# Patient Record
Sex: FEMALE | Race: BLACK OR AFRICAN AMERICAN | NOT HISPANIC OR LATINO
[De-identification: names, ages, dates, MRNs, and addresses within clinical notes are randomized per-mention and may not be internally consistent; named-entity substitution may affect disease eponyms.]

---

## 2017-08-18 ENCOUNTER — APPOINTMENT (OUTPATIENT)
Dept: PULMONOLOGY | Facility: CLINIC | Age: 37
End: 2017-08-18
Payer: MEDICAID

## 2017-08-18 PROCEDURE — 94060 EVALUATION OF WHEEZING: CPT

## 2017-08-18 PROCEDURE — 94726 PLETHYSMOGRAPHY LUNG VOLUMES: CPT

## 2017-08-18 PROCEDURE — 94729 DIFFUSING CAPACITY: CPT

## 2017-10-23 ENCOUNTER — EMERGENCY (EMERGENCY)
Facility: HOSPITAL | Age: 37
LOS: 1 days | Discharge: HOME | End: 2017-10-23
Attending: EMERGENCY MEDICINE | Admitting: EMERGENCY MEDICINE
Payer: MEDICAID

## 2017-10-23 VITALS
RESPIRATION RATE: 18 BRPM | TEMPERATURE: 98 F | SYSTOLIC BLOOD PRESSURE: 112 MMHG | DIASTOLIC BLOOD PRESSURE: 67 MMHG | OXYGEN SATURATION: 97 % | HEART RATE: 90 BPM

## 2017-10-23 VITALS
HEART RATE: 84 BPM | TEMPERATURE: 99 F | DIASTOLIC BLOOD PRESSURE: 102 MMHG | SYSTOLIC BLOOD PRESSURE: 156 MMHG | RESPIRATION RATE: 18 BRPM | OXYGEN SATURATION: 98 %

## 2017-10-23 LAB
ALBUMIN SERPL ELPH-MCNC: 4.1 G/DL — SIGNIFICANT CHANGE UP (ref 3.3–5)
ALP SERPL-CCNC: 64 U/L — SIGNIFICANT CHANGE UP (ref 40–120)
ALT FLD-CCNC: 15 U/L — SIGNIFICANT CHANGE UP (ref 4–33)
APPEARANCE UR: CLEAR — SIGNIFICANT CHANGE UP
AST SERPL-CCNC: 20 U/L — SIGNIFICANT CHANGE UP (ref 4–32)
BACTERIA # UR AUTO: SIGNIFICANT CHANGE UP
BASE EXCESS BLDV CALC-SCNC: 7.2 MMOL/L — SIGNIFICANT CHANGE UP
BASOPHILS # BLD AUTO: 0.03 K/UL — SIGNIFICANT CHANGE UP (ref 0–0.2)
BASOPHILS NFR BLD AUTO: 0.3 % — SIGNIFICANT CHANGE UP (ref 0–2)
BILIRUB SERPL-MCNC: 0.3 MG/DL — SIGNIFICANT CHANGE UP (ref 0.2–1.2)
BILIRUB UR-MCNC: NEGATIVE — SIGNIFICANT CHANGE UP
BLOOD UR QL VISUAL: NEGATIVE — SIGNIFICANT CHANGE UP
BUN SERPL-MCNC: 8 MG/DL — SIGNIFICANT CHANGE UP (ref 7–23)
CALCIUM SERPL-MCNC: 9.7 MG/DL — SIGNIFICANT CHANGE UP (ref 8.4–10.5)
CHLORIDE SERPL-SCNC: 96 MMOL/L — LOW (ref 98–107)
CK MB BLD-MCNC: 2.98 NG/ML — SIGNIFICANT CHANGE UP (ref 1–4.7)
CK MB BLD-MCNC: 3.01 NG/ML — SIGNIFICANT CHANGE UP (ref 1–4.7)
CK SERPL-CCNC: 250 U/L — HIGH (ref 25–170)
CK SERPL-CCNC: 258 U/L — HIGH (ref 25–170)
CO2 SERPL-SCNC: 29 MMOL/L — SIGNIFICANT CHANGE UP (ref 22–31)
COLOR SPEC: SIGNIFICANT CHANGE UP
CREAT SERPL-MCNC: 0.59 MG/DL — SIGNIFICANT CHANGE UP (ref 0.5–1.3)
EOSINOPHIL # BLD AUTO: 0.24 K/UL — SIGNIFICANT CHANGE UP (ref 0–0.5)
EOSINOPHIL NFR BLD AUTO: 2.5 % — SIGNIFICANT CHANGE UP (ref 0–6)
GAS PNL BLDV: 136 MMOL/L — SIGNIFICANT CHANGE UP (ref 136–146)
GLUCOSE BLDV-MCNC: 97 — SIGNIFICANT CHANGE UP (ref 70–99)
GLUCOSE SERPL-MCNC: 91 MG/DL — SIGNIFICANT CHANGE UP (ref 70–99)
GLUCOSE UR-MCNC: NEGATIVE — SIGNIFICANT CHANGE UP
HCO3 BLDV-SCNC: 28 MMOL/L — HIGH (ref 20–27)
HCT VFR BLD CALC: 41.9 % — SIGNIFICANT CHANGE UP (ref 34.5–45)
HCT VFR BLDV CALC: 41.2 % — SIGNIFICANT CHANGE UP (ref 34.5–45)
HGB BLD-MCNC: 13 G/DL — SIGNIFICANT CHANGE UP (ref 11.5–15.5)
HGB BLDV-MCNC: 13.4 G/DL — SIGNIFICANT CHANGE UP (ref 11.5–15.5)
IMM GRANULOCYTES # BLD AUTO: 0.02 # — SIGNIFICANT CHANGE UP
IMM GRANULOCYTES NFR BLD AUTO: 0.2 % — SIGNIFICANT CHANGE UP (ref 0–1.5)
KETONES UR-MCNC: NEGATIVE — SIGNIFICANT CHANGE UP
LEUKOCYTE ESTERASE UR-ACNC: NEGATIVE — SIGNIFICANT CHANGE UP
LIDOCAIN IGE QN: 32.2 U/L — SIGNIFICANT CHANGE UP (ref 7–60)
LYMPHOCYTES # BLD AUTO: 2.49 K/UL — SIGNIFICANT CHANGE UP (ref 1–3.3)
LYMPHOCYTES # BLD AUTO: 26.4 % — SIGNIFICANT CHANGE UP (ref 13–44)
MCHC RBC-ENTMCNC: 26.5 PG — LOW (ref 27–34)
MCHC RBC-ENTMCNC: 31 % — LOW (ref 32–36)
MCV RBC AUTO: 85.3 FL — SIGNIFICANT CHANGE UP (ref 80–100)
MONOCYTES # BLD AUTO: 0.32 K/UL — SIGNIFICANT CHANGE UP (ref 0–0.9)
MONOCYTES NFR BLD AUTO: 3.4 % — SIGNIFICANT CHANGE UP (ref 2–14)
MUCOUS THREADS # UR AUTO: SIGNIFICANT CHANGE UP
NEUTROPHILS # BLD AUTO: 6.34 K/UL — SIGNIFICANT CHANGE UP (ref 1.8–7.4)
NEUTROPHILS NFR BLD AUTO: 67.2 % — SIGNIFICANT CHANGE UP (ref 43–77)
NITRITE UR-MCNC: NEGATIVE — SIGNIFICANT CHANGE UP
NRBC # FLD: 0 — SIGNIFICANT CHANGE UP
PCO2 BLDV: 58 MMHG — HIGH (ref 41–51)
PH BLDV: 7.37 PH — SIGNIFICANT CHANGE UP (ref 7.32–7.43)
PH UR: 7.5 — SIGNIFICANT CHANGE UP (ref 4.6–8)
PLATELET # BLD AUTO: 337 K/UL — SIGNIFICANT CHANGE UP (ref 150–400)
PMV BLD: 9.8 FL — SIGNIFICANT CHANGE UP (ref 7–13)
PO2 BLDV: < 24 MMHG — LOW (ref 35–40)
POTASSIUM BLDV-SCNC: 3.7 MMOL/L — SIGNIFICANT CHANGE UP (ref 3.4–4.5)
POTASSIUM SERPL-MCNC: 3.8 MMOL/L — SIGNIFICANT CHANGE UP (ref 3.5–5.3)
POTASSIUM SERPL-SCNC: 3.8 MMOL/L — SIGNIFICANT CHANGE UP (ref 3.5–5.3)
PROT SERPL-MCNC: 8.1 G/DL — SIGNIFICANT CHANGE UP (ref 6–8.3)
PROT UR-MCNC: NEGATIVE — SIGNIFICANT CHANGE UP
RBC # BLD: 4.91 M/UL — SIGNIFICANT CHANGE UP (ref 3.8–5.2)
RBC # FLD: 14.1 % — SIGNIFICANT CHANGE UP (ref 10.3–14.5)
RBC CASTS # UR COMP ASSIST: SIGNIFICANT CHANGE UP (ref 0–?)
SAO2 % BLDV: 35 % — LOW (ref 60–85)
SODIUM SERPL-SCNC: 140 MMOL/L — SIGNIFICANT CHANGE UP (ref 135–145)
SP GR SPEC: 1.01 — SIGNIFICANT CHANGE UP (ref 1–1.03)
SQUAMOUS # UR AUTO: SIGNIFICANT CHANGE UP
TROPONIN T SERPL-MCNC: < 0.06 NG/ML — SIGNIFICANT CHANGE UP (ref 0–0.06)
TROPONIN T SERPL-MCNC: < 0.06 NG/ML — SIGNIFICANT CHANGE UP (ref 0–0.06)
UROBILINOGEN FLD QL: NORMAL E.U. — SIGNIFICANT CHANGE UP (ref 0.1–0.2)
WBC # BLD: 9.44 K/UL — SIGNIFICANT CHANGE UP (ref 3.8–10.5)
WBC # FLD AUTO: 9.44 K/UL — SIGNIFICANT CHANGE UP (ref 3.8–10.5)
WBC UR QL: SIGNIFICANT CHANGE UP (ref 0–?)

## 2017-10-23 PROCEDURE — 93010 ELECTROCARDIOGRAM REPORT: CPT

## 2017-10-23 PROCEDURE — 71020: CPT | Mod: 26

## 2017-10-23 PROCEDURE — 99285 EMERGENCY DEPT VISIT HI MDM: CPT | Mod: 25

## 2017-10-23 PROCEDURE — 74177 CT ABD & PELVIS W/CONTRAST: CPT | Mod: 26

## 2017-10-23 RX ORDER — MORPHINE SULFATE 50 MG/1
4 CAPSULE, EXTENDED RELEASE ORAL ONCE
Qty: 0 | Refills: 0 | Status: DISCONTINUED | OUTPATIENT
Start: 2017-10-23 | End: 2017-10-23

## 2017-10-23 RX ADMIN — MORPHINE SULFATE 4 MILLIGRAM(S): 50 CAPSULE, EXTENDED RELEASE ORAL at 21:45

## 2017-10-23 RX ADMIN — MORPHINE SULFATE 4 MILLIGRAM(S): 50 CAPSULE, EXTENDED RELEASE ORAL at 22:00

## 2017-10-23 NOTE — ED PROVIDER NOTE - ATTENDING CONTRIBUTION TO CARE
Юлия: 38 yo female with DM, HTN, and morbid obesity c/o epigastric/ LUQ abdominal pain. + associated nausea and 2 episodes of blood tinged vomit today. No associated chest pain, or SOB but pt had near syncopal event with vomiting. No diarrhea, fevers or chills. No dysuria, flank pain or hematuria. Exam: morbidly obese with possible splenomegaly on palpation of abdomen. + epigastric and LUQ TTP, no guarding, abdomen soft. cardiac and lung exam unremarkable. No LE edema or calf TTP. No CVA TTP. Plan: cbc, cmp, lipase, vbg, u/a, ct abdomen, cardiac enzymes, cxr, reassess

## 2017-10-23 NOTE — ED PROVIDER NOTE - PROGRESS NOTE DETAILS
PHILIPP Davis: received sign out from Dr Hoffman to follow up CT results and CE x 2.  Pt feels better pain improved.  CT negative and CE x 2 negative.  Discharge and results reviewed with patient.

## 2017-10-23 NOTE — ED PROVIDER NOTE - OBJECTIVE STATEMENT
37F with pmhx DM2, HTN, morbid obesity presents to ED with chief complaint of left upper quadrant pain, nausea, vomiting. patient reports she is in process of being cleared for gastric sleeve surgery, and has been seeing nutritionist for diet and exercise regimens in preparation for surgical clearance. Patient reports she has been experiencing intermittent dizziness since Friday, along with one day of left upper quadrant pain, nausea, and 2 episodes of blood tinged vomiting. Denies fevers or chills, diarrhea or constipation, chest pain, shortness of breath, headache, blurry vision. Patient reports drinking only liquid protein shakes since yesterday, as well as increasing her exercise regimen recently. Meds include victozan, HCTZ, glimepiride. No allergies. 37F with pmhx DM2, HTN, morbid obesity presents to ED with chief complaint of left upper quadrant pain, nausea, vomiting. patient reports she is in process of being cleared for gastric sleeve surgery, and has been seeing nutritionist for diet and exercise regimens in preparation for surgical clearance. Patient reports she has been experiencing intermittent dizziness since Friday, along with one day of left upper quadrant pain, nausea, and 2 episodes of blood tinged vomiting. States she had a near syncopal episode today during an episode of vomiting. Denies fevers or chills, diarrhea or constipation, chest pain, shortness of breath, headache, blurry vision. Patient reports drinking only liquid protein shakes since yesterday, as well as increasing her exercise regimen recently. Meds include victozan, HCTZ, glimepiride. No allergies.

## 2017-10-23 NOTE — ED ADULT TRIAGE NOTE - CHIEF COMPLAINT QUOTE
Pt c/o abd pain, N/V, and dizziness since Friday.  Denies diarrhea.  Recently placed on diet by nutrisionist for gastric sleeve preparation.

## 2017-10-23 NOTE — ED PROVIDER NOTE - CARE PLAN
Principal Discharge DX:	Abdominal pain Principal Discharge DX:	Abdominal pain  Instructions for follow-up, activity and diet:	Follow up with your Doctor in 1-2 days.  Rest.  Drink plenty of fluids.  Return to the ER for any persistent/worsening or new symptoms fevers, chills, chest pain, shortness of breath, weakness, dizziness or any concerning symptoms.

## 2017-10-23 NOTE — ED PROVIDER NOTE - MEDICAL DECISION MAKING DETAILS
37F with pmhx DM2, HTN, morbid obesity presents to ED with chief complaint of left upper quadrant pain, nausea, vomiting. Will obtain labs (cbc, cmp, vbg, lipase, enzymes, ua), CT abd/pelvis. Stable. Will f/u and re-assess.  Natanael Hoffman MD, PGY1

## 2017-10-23 NOTE — ED PROVIDER NOTE - PLAN OF CARE
Follow up with your Doctor in 1-2 days.  Rest.  Drink plenty of fluids.  Return to the ER for any persistent/worsening or new symptoms fevers, chills, chest pain, shortness of breath, weakness, dizziness or any concerning symptoms.

## 2018-06-22 ENCOUNTER — APPOINTMENT (OUTPATIENT)
Dept: SURGERY | Facility: CLINIC | Age: 38
End: 2018-06-22
Payer: MEDICAID

## 2018-06-22 PROCEDURE — 99215 OFFICE O/P EST HI 40 MIN: CPT

## 2018-07-01 ENCOUNTER — OUTPATIENT (OUTPATIENT)
Dept: OUTPATIENT SERVICES | Facility: HOSPITAL | Age: 38
LOS: 1 days | End: 2018-07-01
Payer: MEDICAID

## 2018-07-10 ENCOUNTER — APPOINTMENT (OUTPATIENT)
Dept: SURGERY | Facility: CLINIC | Age: 38
End: 2018-07-10

## 2018-07-10 ENCOUNTER — OUTPATIENT (OUTPATIENT)
Dept: OUTPATIENT SERVICES | Facility: HOSPITAL | Age: 38
LOS: 1 days | End: 2018-07-10
Payer: MEDICAID

## 2018-07-10 VITALS
DIASTOLIC BLOOD PRESSURE: 90 MMHG | SYSTOLIC BLOOD PRESSURE: 120 MMHG | HEIGHT: 62 IN | TEMPERATURE: 98 F | RESPIRATION RATE: 16 BRPM | WEIGHT: 250 LBS | HEART RATE: 86 BPM | OXYGEN SATURATION: 98 %

## 2018-07-10 DIAGNOSIS — Z01.818 ENCOUNTER FOR OTHER PREPROCEDURAL EXAMINATION: ICD-10-CM

## 2018-07-10 DIAGNOSIS — G47.33 OBSTRUCTIVE SLEEP APNEA (ADULT) (PEDIATRIC): ICD-10-CM

## 2018-07-10 DIAGNOSIS — E66.01 MORBID (SEVERE) OBESITY DUE TO EXCESS CALORIES: ICD-10-CM

## 2018-07-10 DIAGNOSIS — Z29.9 ENCOUNTER FOR PROPHYLACTIC MEASURES, UNSPECIFIED: ICD-10-CM

## 2018-07-10 DIAGNOSIS — Z98.891 HISTORY OF UTERINE SCAR FROM PREVIOUS SURGERY: Chronic | ICD-10-CM

## 2018-07-10 DIAGNOSIS — E11.9 TYPE 2 DIABETES MELLITUS WITHOUT COMPLICATIONS: ICD-10-CM

## 2018-07-10 LAB
ALBUMIN SERPL ELPH-MCNC: 4.5 G/DL — SIGNIFICANT CHANGE UP (ref 3.3–5)
ALP SERPL-CCNC: 66 U/L — SIGNIFICANT CHANGE UP (ref 40–120)
ALT FLD-CCNC: 23 U/L — SIGNIFICANT CHANGE UP (ref 10–45)
ANION GAP SERPL CALC-SCNC: 15 MMOL/L — SIGNIFICANT CHANGE UP (ref 5–17)
AST SERPL-CCNC: 23 U/L — SIGNIFICANT CHANGE UP (ref 10–40)
BILIRUB SERPL-MCNC: 0.4 MG/DL — SIGNIFICANT CHANGE UP (ref 0.2–1.2)
BLD GP AB SCN SERPL QL: NEGATIVE — SIGNIFICANT CHANGE UP
BUN SERPL-MCNC: 8 MG/DL — SIGNIFICANT CHANGE UP (ref 7–23)
CALCIUM SERPL-MCNC: 9.5 MG/DL — SIGNIFICANT CHANGE UP (ref 8.4–10.5)
CHLORIDE SERPL-SCNC: 93 MMOL/L — LOW (ref 96–108)
CO2 SERPL-SCNC: 28 MMOL/L — SIGNIFICANT CHANGE UP (ref 22–31)
CREAT SERPL-MCNC: 0.51 MG/DL — SIGNIFICANT CHANGE UP (ref 0.5–1.3)
GLUCOSE SERPL-MCNC: 68 MG/DL — LOW (ref 70–99)
HBA1C BLD-MCNC: 8.9 % — HIGH (ref 4–5.6)
HCT VFR BLD CALC: 40.1 % — SIGNIFICANT CHANGE UP (ref 34.5–45)
HGB BLD-MCNC: 13.2 G/DL — SIGNIFICANT CHANGE UP (ref 11.5–15.5)
MCHC RBC-ENTMCNC: 27.6 PG — SIGNIFICANT CHANGE UP (ref 27–34)
MCHC RBC-ENTMCNC: 32.9 GM/DL — SIGNIFICANT CHANGE UP (ref 32–36)
MCV RBC AUTO: 83.9 FL — SIGNIFICANT CHANGE UP (ref 80–100)
PLATELET # BLD AUTO: 342 K/UL — SIGNIFICANT CHANGE UP (ref 150–400)
POTASSIUM SERPL-MCNC: 3.5 MMOL/L — SIGNIFICANT CHANGE UP (ref 3.5–5.3)
POTASSIUM SERPL-SCNC: 3.5 MMOL/L — SIGNIFICANT CHANGE UP (ref 3.5–5.3)
PROT SERPL-MCNC: 8.7 G/DL — HIGH (ref 6–8.3)
RBC # BLD: 4.78 M/UL — SIGNIFICANT CHANGE UP (ref 3.8–5.2)
RBC # FLD: 14.3 % — SIGNIFICANT CHANGE UP (ref 10.3–14.5)
RH IG SCN BLD-IMP: POSITIVE — SIGNIFICANT CHANGE UP
SODIUM SERPL-SCNC: 136 MMOL/L — SIGNIFICANT CHANGE UP (ref 135–145)
WBC # BLD: 10.23 K/UL — SIGNIFICANT CHANGE UP (ref 3.8–10.5)
WBC # FLD AUTO: 10.23 K/UL — SIGNIFICANT CHANGE UP (ref 3.8–10.5)

## 2018-07-10 PROCEDURE — 86900 BLOOD TYPING SEROLOGIC ABO: CPT

## 2018-07-10 PROCEDURE — 86901 BLOOD TYPING SEROLOGIC RH(D): CPT

## 2018-07-10 PROCEDURE — 83036 HEMOGLOBIN GLYCOSYLATED A1C: CPT

## 2018-07-10 PROCEDURE — G0463: CPT

## 2018-07-10 PROCEDURE — 86850 RBC ANTIBODY SCREEN: CPT

## 2018-07-10 PROCEDURE — 85027 COMPLETE CBC AUTOMATED: CPT

## 2018-07-10 PROCEDURE — 80053 COMPREHEN METABOLIC PANEL: CPT

## 2018-07-10 RX ORDER — HEPARIN SODIUM 5000 [USP'U]/ML
5000 INJECTION INTRAVENOUS; SUBCUTANEOUS ONCE
Qty: 0 | Refills: 0 | Status: COMPLETED | OUTPATIENT
Start: 2018-07-18 | End: 2018-07-18

## 2018-07-10 RX ORDER — SODIUM CHLORIDE 9 MG/ML
3 INJECTION INTRAMUSCULAR; INTRAVENOUS; SUBCUTANEOUS EVERY 8 HOURS
Qty: 0 | Refills: 0 | Status: DISCONTINUED | OUTPATIENT
Start: 2018-07-18 | End: 2018-07-18

## 2018-07-10 RX ORDER — LIDOCAINE HCL 20 MG/ML
0.2 VIAL (ML) INJECTION ONCE
Qty: 0 | Refills: 0 | Status: DISCONTINUED | OUTPATIENT
Start: 2018-07-18 | End: 2018-07-18

## 2018-07-10 RX ORDER — CEFAZOLIN SODIUM 1 G
2000 VIAL (EA) INJECTION ONCE
Qty: 0 | Refills: 0 | Status: DISCONTINUED | OUTPATIENT
Start: 2018-07-18 | End: 2018-07-21

## 2018-07-10 NOTE — H&P PST ADULT - CONSTITUTIONAL DETAILS
well-developed/well-groomed/well-nourished/no distress no distress/well-developed/well-groomed/well-nourished/obese

## 2018-07-10 NOTE — H&P PST ADULT - PROBLEM SELECTOR PLAN 2
last HgA1c 9.5 on 6/2018 ( emailed Dr. Mason) will repeat at [facility]  FS the day of procedure  Glimepiride hold the day of procedure

## 2018-07-10 NOTE — H&P PST ADULT - HISTORY OF PRESENT ILLNESS
37 year old female with PMH of DM2, HTN, morbid obesity planned for laparoscopic vertical sleeve gastrectomy 7/18/18.

## 2018-07-10 NOTE — H&P PST ADULT - ENDOCRINE COMMENTS
No BS home monitoring No BS home monitoring, last hgA1c 9.5 on 6/2018 on Victoza daily and Glimepiride

## 2018-07-10 NOTE — H&P PST ADULT - ASSESSMENT
CAPRINI SCORE [CLOT]    AGE RELATED RISK FACTORS                                                       MOBILITY RELATED FACTORS  [ ] Age 41-60 years                                            (1 Point)                  [ ] Bed rest                                                        (1 Point)  [ ] Age: 61-74 years                                           (2 Points)                 [ ] Plaster cast                                                   (2 Points)  [ ] Age= 75 years                                              (3 Points)                 [ ] Bed bound for more than 72 hours                 (2 Points)    DISEASE RELATED RISK FACTORS                                               GENDER SPECIFIC FACTORS  [ ] Edema in the lower extremities                       (1 Point)                  [ ] Pregnancy                                                     (1 Point)  [ ] Varicose veins                                               (1 Point)                  [ ] Post-partum < 6 weeks                                   (1 Point)             [ x] BMI > 25 Kg/m2                                            (1 Point)                  [ ] Hormonal therapy  or oral contraception          (1 Point)                 [ ] Sepsis (in the previous month)                        (1 Point)                  [ ] History of pregnancy complications                 (1 point)  [ ] Pneumonia or serious lung disease                                               [ ] Unexplained or recurrent                     (1 Point)           (in the previous month)                               (1 Point)  [ ] Abnormal pulmonary function test                     (1 Point)                 SURGERY RELATED RISK FACTORS  [ ] Acute myocardial infarction                              (1 Point)                 [ ]  Section                                             (1 Point)  [ ] Congestive heart failure (in the previous month)  (1 Point)               [ ] Minor surgery                                                  (1 Point)   [ ] Inflammatory bowel disease                             (1 Point)                 [ ] Arthroscopic surgery                                        (2 Points)  [ ] Central venous access                                      (2 Points)                [x ] General surgery lasting more than 45 minutes   (2 Points)       [ ] Stroke (in the previous month)                          (5 Points)               [ ] Elective arthroplasty                                         (5 Points)                                                                                                                                               HEMATOLOGY RELATED FACTORS                                                 TRAUMA RELATED RISK FACTORS  [ ] Prior episodes of VTE                                     (3 Points)                 [ ] Fracture of the hip, pelvis, or leg                       (5 Points)  [ ] Positive family history for VTE                         (3 Points)                 [ ] Acute spinal cord injury (in the previous month)  (5 Points)  [ ] Prothrombin 46590 A                                     (3 Points)                 [ ] Paralysis  (less than 1 month)                             (5 Points)  [ ] Factor V Leiden                                             (3 Points)                  [ ] Multiple Trauma within 1 month                        (5 Points)  [ ] Lupus anticoagulants                                     (3 Points)                                                           [ ] Anticardiolipin antibodies                               (3 Points)                                                       [ ] High homocysteine in the blood                      (3 Points)                                             [ ] Other congenital or acquired thrombophilia      (3 Points)                                                [ ] Heparin induced thrombocytopenia                  (3 Points)                                          Total Score [   3       ]

## 2018-07-10 NOTE — H&P PST ADULT - PMH
DM2 (diabetes mellitus, type 2)    HTN (hypertension)    LATOYA on CPAP DM2 (diabetes mellitus, type 2)    HTN (hypertension)    Morbid obesity with BMI of 40.0-44.9, adult    LATOYA on CPAP

## 2018-07-16 NOTE — PHARMACY COMMUNICATION NOTE - COMMENTS
Patient medication reconciliation done. Patient currently taking:     Glimepiride 4mg by mouth daily  Hydrochlorothiazide 25mg by mouth daily  Victoza 18mg subcutaneously daily     Patient was instructed to use crushed, dissolvable, chewable, or liquid formulations of medications for 1 month. Patient was informed to take daily multivitamins post surgically. Patient reeducated on NSAID avoidance (ibuprofen, ASA, naproxen, aleve) as they increased risk of GI bleeding; may use APAP for mild pain otherwise contact prescriber for consult. Patient was informed on indications and directions for administration for hyoscyamine SL, oxycodone liquid, ondansetron ODT, and omeprazole DR. Patient was instructed to take the medications as follows:    Hold glimepiride and hydrochlorothiazide after surgery  Continue victoza after surgery

## 2018-07-17 ENCOUNTER — TRANSCRIPTION ENCOUNTER (OUTPATIENT)
Age: 38
End: 2018-07-17

## 2018-07-18 ENCOUNTER — RESULT REVIEW (OUTPATIENT)
Age: 38
End: 2018-07-18

## 2018-07-18 ENCOUNTER — APPOINTMENT (OUTPATIENT)
Dept: SURGERY | Facility: HOSPITAL | Age: 38
End: 2018-07-18
Payer: MEDICAID

## 2018-07-18 ENCOUNTER — INPATIENT (INPATIENT)
Facility: HOSPITAL | Age: 38
LOS: 2 days | Discharge: HOME | DRG: 621 | End: 2018-07-21
Attending: SURGERY | Admitting: SURGERY
Payer: MEDICAID

## 2018-07-18 VITALS
DIASTOLIC BLOOD PRESSURE: 88 MMHG | TEMPERATURE: 98 F | HEART RATE: 84 BPM | SYSTOLIC BLOOD PRESSURE: 134 MMHG | RESPIRATION RATE: 20 BRPM | HEIGHT: 62 IN | WEIGHT: 245.82 LBS | OXYGEN SATURATION: 97 %

## 2018-07-18 DIAGNOSIS — E66.01 MORBID (SEVERE) OBESITY DUE TO EXCESS CALORIES: ICD-10-CM

## 2018-07-18 DIAGNOSIS — Z98.891 HISTORY OF UTERINE SCAR FROM PREVIOUS SURGERY: Chronic | ICD-10-CM

## 2018-07-18 LAB
ANION GAP SERPL CALC-SCNC: 17 MMOL/L — SIGNIFICANT CHANGE UP (ref 5–17)
ANION GAP SERPL CALC-SCNC: 18 MMOL/L — HIGH (ref 5–17)
BASOPHILS # BLD AUTO: 0.1 K/UL — SIGNIFICANT CHANGE UP (ref 0–0.2)
BASOPHILS NFR BLD AUTO: 0.4 % — SIGNIFICANT CHANGE UP (ref 0–2)
BUN SERPL-MCNC: 5 MG/DL — LOW (ref 7–23)
BUN SERPL-MCNC: 5 MG/DL — LOW (ref 7–23)
CALCIUM SERPL-MCNC: 7.9 MG/DL — LOW (ref 8.4–10.5)
CALCIUM SERPL-MCNC: 8.6 MG/DL — SIGNIFICANT CHANGE UP (ref 8.4–10.5)
CHLORIDE SERPL-SCNC: 95 MMOL/L — LOW (ref 96–108)
CHLORIDE SERPL-SCNC: 97 MMOL/L — SIGNIFICANT CHANGE UP (ref 96–108)
CO2 SERPL-SCNC: 24 MMOL/L — SIGNIFICANT CHANGE UP (ref 22–31)
CO2 SERPL-SCNC: 25 MMOL/L — SIGNIFICANT CHANGE UP (ref 22–31)
CREAT SERPL-MCNC: 0.48 MG/DL — LOW (ref 0.5–1.3)
CREAT SERPL-MCNC: 0.53 MG/DL — SIGNIFICANT CHANGE UP (ref 0.5–1.3)
EOSINOPHIL # BLD AUTO: 0.1 K/UL — SIGNIFICANT CHANGE UP (ref 0–0.5)
EOSINOPHIL NFR BLD AUTO: 0.9 % — SIGNIFICANT CHANGE UP (ref 0–6)
GLUCOSE BLDC GLUCOMTR-MCNC: 168 MG/DL — HIGH (ref 70–99)
GLUCOSE BLDC GLUCOMTR-MCNC: 187 MG/DL — HIGH (ref 70–99)
GLUCOSE BLDC GLUCOMTR-MCNC: 98 MG/DL — SIGNIFICANT CHANGE UP (ref 70–99)
GLUCOSE SERPL-MCNC: 177 MG/DL — HIGH (ref 70–99)
GLUCOSE SERPL-MCNC: 208 MG/DL — HIGH (ref 70–99)
HCG UR QL: NEGATIVE — SIGNIFICANT CHANGE UP
HCT VFR BLD CALC: 39.2 % — SIGNIFICANT CHANGE UP (ref 34.5–45)
HGB BLD-MCNC: 12.9 G/DL — SIGNIFICANT CHANGE UP (ref 11.5–15.5)
LYMPHOCYTES # BLD AUTO: 18.8 % — SIGNIFICANT CHANGE UP (ref 13–44)
LYMPHOCYTES # BLD AUTO: 2.8 K/UL — SIGNIFICANT CHANGE UP (ref 1–3.3)
MAGNESIUM SERPL-MCNC: 2.1 MG/DL — SIGNIFICANT CHANGE UP (ref 1.6–2.6)
MCHC RBC-ENTMCNC: 28.3 PG — SIGNIFICANT CHANGE UP (ref 27–34)
MCHC RBC-ENTMCNC: 33 GM/DL — SIGNIFICANT CHANGE UP (ref 32–36)
MCV RBC AUTO: 85.8 FL — SIGNIFICANT CHANGE UP (ref 80–100)
MONOCYTES # BLD AUTO: 0.2 K/UL — SIGNIFICANT CHANGE UP (ref 0–0.9)
MONOCYTES NFR BLD AUTO: 1.1 % — LOW (ref 2–14)
NEUTROPHILS # BLD AUTO: 11.9 K/UL — HIGH (ref 1.8–7.4)
NEUTROPHILS NFR BLD AUTO: 78.9 % — HIGH (ref 43–77)
PHOSPHATE SERPL-MCNC: 3.4 MG/DL — SIGNIFICANT CHANGE UP (ref 2.5–4.5)
PLATELET # BLD AUTO: 302 K/UL — SIGNIFICANT CHANGE UP (ref 150–400)
POTASSIUM SERPL-MCNC: 3.2 MMOL/L — LOW (ref 3.5–5.3)
POTASSIUM SERPL-MCNC: 3.4 MMOL/L — LOW (ref 3.5–5.3)
POTASSIUM SERPL-SCNC: 3.2 MMOL/L — LOW (ref 3.5–5.3)
POTASSIUM SERPL-SCNC: 3.4 MMOL/L — LOW (ref 3.5–5.3)
RBC # BLD: 4.57 M/UL — SIGNIFICANT CHANGE UP (ref 3.8–5.2)
RBC # FLD: 12.9 % — SIGNIFICANT CHANGE UP (ref 10.3–14.5)
RH IG SCN BLD-IMP: POSITIVE — SIGNIFICANT CHANGE UP
SODIUM SERPL-SCNC: 138 MMOL/L — SIGNIFICANT CHANGE UP (ref 135–145)
SODIUM SERPL-SCNC: 138 MMOL/L — SIGNIFICANT CHANGE UP (ref 135–145)
WBC # BLD: 15.1 K/UL — HIGH (ref 3.8–10.5)
WBC # FLD AUTO: 15.1 K/UL — HIGH (ref 3.8–10.5)

## 2018-07-18 PROCEDURE — 88305 TISSUE EXAM BY PATHOLOGIST: CPT | Mod: 26

## 2018-07-18 PROCEDURE — 43775 LAP SLEEVE GASTRECTOMY: CPT

## 2018-07-18 RX ORDER — LIRAGLUTIDE 6 MG/ML
0 INJECTION SUBCUTANEOUS
Qty: 0 | Refills: 0 | COMMUNITY

## 2018-07-18 RX ORDER — DEXTROSE 50 % IN WATER 50 %
12.5 SYRINGE (ML) INTRAVENOUS ONCE
Qty: 0 | Refills: 0 | Status: DISCONTINUED | OUTPATIENT
Start: 2018-07-18 | End: 2018-07-21

## 2018-07-18 RX ORDER — ONDANSETRON 8 MG/1
4 TABLET, FILM COATED ORAL EVERY 6 HOURS
Qty: 0 | Refills: 0 | Status: DISCONTINUED | OUTPATIENT
Start: 2018-07-18 | End: 2018-07-21

## 2018-07-18 RX ORDER — DEXTROSE 50 % IN WATER 50 %
15 SYRINGE (ML) INTRAVENOUS ONCE
Qty: 0 | Refills: 0 | Status: DISCONTINUED | OUTPATIENT
Start: 2018-07-18 | End: 2018-07-21

## 2018-07-18 RX ORDER — KETOROLAC TROMETHAMINE 30 MG/ML
30 SYRINGE (ML) INJECTION EVERY 6 HOURS
Qty: 0 | Refills: 0 | Status: DISCONTINUED | OUTPATIENT
Start: 2018-07-18 | End: 2018-07-19

## 2018-07-18 RX ORDER — HEPARIN SODIUM 5000 [USP'U]/ML
5000 INJECTION INTRAVENOUS; SUBCUTANEOUS EVERY 8 HOURS
Qty: 0 | Refills: 0 | Status: DISCONTINUED | OUTPATIENT
Start: 2018-07-18 | End: 2018-07-21

## 2018-07-18 RX ORDER — ACETAMINOPHEN 500 MG
1000 TABLET ORAL ONCE
Qty: 0 | Refills: 0 | Status: COMPLETED | OUTPATIENT
Start: 2018-07-18 | End: 2018-07-18

## 2018-07-18 RX ORDER — FAMOTIDINE 10 MG/ML
20 INJECTION INTRAVENOUS ONCE
Qty: 0 | Refills: 0 | Status: COMPLETED | OUTPATIENT
Start: 2018-07-18 | End: 2018-07-18

## 2018-07-18 RX ORDER — POTASSIUM CHLORIDE 20 MEQ
10 PACKET (EA) ORAL
Qty: 0 | Refills: 0 | Status: COMPLETED | OUTPATIENT
Start: 2018-07-18 | End: 2018-07-18

## 2018-07-18 RX ORDER — DEXTROSE 50 % IN WATER 50 %
25 SYRINGE (ML) INTRAVENOUS ONCE
Qty: 0 | Refills: 0 | Status: DISCONTINUED | OUTPATIENT
Start: 2018-07-18 | End: 2018-07-21

## 2018-07-18 RX ORDER — INSULIN LISPRO 100/ML
VIAL (ML) SUBCUTANEOUS EVERY 6 HOURS
Qty: 0 | Refills: 0 | Status: DISCONTINUED | OUTPATIENT
Start: 2018-07-18 | End: 2018-07-21

## 2018-07-18 RX ORDER — POTASSIUM CHLORIDE 20 MEQ
10 PACKET (EA) ORAL
Qty: 0 | Refills: 0 | Status: COMPLETED | OUTPATIENT
Start: 2018-07-18 | End: 2018-07-19

## 2018-07-18 RX ORDER — SODIUM CHLORIDE 9 MG/ML
1000 INJECTION, SOLUTION INTRAVENOUS
Qty: 0 | Refills: 0 | Status: DISCONTINUED | OUTPATIENT
Start: 2018-07-18 | End: 2018-07-21

## 2018-07-18 RX ORDER — SODIUM CHLORIDE 9 MG/ML
1000 INJECTION, SOLUTION INTRAVENOUS
Qty: 0 | Refills: 0 | Status: COMPLETED | OUTPATIENT
Start: 2018-07-18 | End: 2018-07-18

## 2018-07-18 RX ORDER — ONDANSETRON 8 MG/1
4 TABLET, FILM COATED ORAL ONCE
Qty: 0 | Refills: 0 | Status: COMPLETED | OUTPATIENT
Start: 2018-07-18 | End: 2018-07-18

## 2018-07-18 RX ORDER — GLUCAGON INJECTION, SOLUTION 0.5 MG/.1ML
1 INJECTION, SOLUTION SUBCUTANEOUS ONCE
Qty: 0 | Refills: 0 | Status: DISCONTINUED | OUTPATIENT
Start: 2018-07-18 | End: 2018-07-21

## 2018-07-18 RX ORDER — CEFAZOLIN SODIUM 1 G
2000 VIAL (EA) INJECTION EVERY 8 HOURS
Qty: 0 | Refills: 0 | Status: COMPLETED | OUTPATIENT
Start: 2018-07-18 | End: 2018-07-18

## 2018-07-18 RX ORDER — PANTOPRAZOLE SODIUM 20 MG/1
40 TABLET, DELAYED RELEASE ORAL DAILY
Qty: 0 | Refills: 0 | Status: DISCONTINUED | OUTPATIENT
Start: 2018-07-18 | End: 2018-07-21

## 2018-07-18 RX ORDER — FENTANYL CITRATE 50 UG/ML
25 INJECTION INTRAVENOUS
Qty: 0 | Refills: 0 | Status: DISCONTINUED | OUTPATIENT
Start: 2018-07-18 | End: 2018-07-18

## 2018-07-18 RX ORDER — ACETAMINOPHEN 500 MG
1000 TABLET ORAL ONCE
Qty: 0 | Refills: 0 | Status: COMPLETED | OUTPATIENT
Start: 2018-07-19 | End: 2018-07-19

## 2018-07-18 RX ORDER — HYOSCYAMINE SULFATE 0.13 MG
0.12 TABLET ORAL EVERY 6 HOURS
Qty: 0 | Refills: 0 | Status: DISCONTINUED | OUTPATIENT
Start: 2018-07-18 | End: 2018-07-19

## 2018-07-18 RX ADMIN — Medication 100 MILLIEQUIVALENT(S): at 12:26

## 2018-07-18 RX ADMIN — Medication 100 MILLIEQUIVALENT(S): at 22:59

## 2018-07-18 RX ADMIN — HEPARIN SODIUM 5000 UNIT(S): 5000 INJECTION INTRAVENOUS; SUBCUTANEOUS at 22:59

## 2018-07-18 RX ADMIN — FAMOTIDINE 20 MILLIGRAM(S): 10 INJECTION INTRAVENOUS at 11:49

## 2018-07-18 RX ADMIN — Medication 30 MILLIGRAM(S): at 23:01

## 2018-07-18 RX ADMIN — Medication 0.12 MILLIGRAM(S): at 11:50

## 2018-07-18 RX ADMIN — PANTOPRAZOLE SODIUM 40 MILLIGRAM(S): 20 TABLET, DELAYED RELEASE ORAL at 12:47

## 2018-07-18 RX ADMIN — ONDANSETRON 4 MILLIGRAM(S): 8 TABLET, FILM COATED ORAL at 17:28

## 2018-07-18 RX ADMIN — Medication 0.12 MILLIGRAM(S): at 23:37

## 2018-07-18 RX ADMIN — Medication 100 MILLIEQUIVALENT(S): at 11:36

## 2018-07-18 RX ADMIN — Medication 2: at 18:08

## 2018-07-18 RX ADMIN — Medication 0.5 MILLIGRAM(S): at 16:38

## 2018-07-18 RX ADMIN — Medication 400 MILLIGRAM(S): at 20:36

## 2018-07-18 RX ADMIN — ONDANSETRON 4 MILLIGRAM(S): 8 TABLET, FILM COATED ORAL at 11:50

## 2018-07-18 RX ADMIN — Medication 30 MILLIGRAM(S): at 17:28

## 2018-07-18 RX ADMIN — Medication 30 MILLIGRAM(S): at 17:30

## 2018-07-18 RX ADMIN — Medication 100 MILLIEQUIVALENT(S): at 10:37

## 2018-07-18 RX ADMIN — Medication 1000 MILLIGRAM(S): at 21:10

## 2018-07-18 RX ADMIN — Medication 100 MILLIGRAM(S): at 22:59

## 2018-07-18 RX ADMIN — SODIUM CHLORIDE 150 MILLILITER(S): 9 INJECTION, SOLUTION INTRAVENOUS at 17:28

## 2018-07-18 RX ADMIN — Medication 400 MILLIGRAM(S): at 14:00

## 2018-07-18 RX ADMIN — Medication 30 MILLIGRAM(S): at 23:31

## 2018-07-18 RX ADMIN — Medication 2: at 13:45

## 2018-07-18 RX ADMIN — Medication 30 MILLIGRAM(S): at 11:51

## 2018-07-18 RX ADMIN — Medication 30 MILLIGRAM(S): at 12:15

## 2018-07-18 RX ADMIN — HEPARIN SODIUM 5000 UNIT(S): 5000 INJECTION INTRAVENOUS; SUBCUTANEOUS at 13:47

## 2018-07-18 RX ADMIN — SODIUM CHLORIDE 250 MILLILITER(S): 9 INJECTION, SOLUTION INTRAVENOUS at 11:47

## 2018-07-18 RX ADMIN — Medication 100 MILLIGRAM(S): at 15:29

## 2018-07-18 RX ADMIN — Medication 1000 MILLIGRAM(S): at 15:00

## 2018-07-18 RX ADMIN — SODIUM CHLORIDE 150 MILLILITER(S): 9 INJECTION, SOLUTION INTRAVENOUS at 23:39

## 2018-07-18 RX ADMIN — Medication 0.12 MILLIGRAM(S): at 17:28

## 2018-07-18 RX ADMIN — ONDANSETRON 4 MILLIGRAM(S): 8 TABLET, FILM COATED ORAL at 10:05

## 2018-07-18 RX ADMIN — ONDANSETRON 4 MILLIGRAM(S): 8 TABLET, FILM COATED ORAL at 23:01

## 2018-07-18 RX ADMIN — HEPARIN SODIUM 5000 UNIT(S): 5000 INJECTION INTRAVENOUS; SUBCUTANEOUS at 06:00

## 2018-07-18 NOTE — PATIENT PROFILE ADULT. - PMH
DM2 (diabetes mellitus, type 2)    HTN (hypertension)    Morbid obesity with BMI of 40.0-44.9, adult    LATOYA on CPAP

## 2018-07-18 NOTE — PATIENT PROFILE ADULT. - NS MD HP INPLANTS MED DEV
Recommendation from your visit today:      1.  Start Entresto 24/26 mg tablet - one tab twice/day.  I will send to your pharmacy to see if insurance will pay.  If not, we      may need to send in a prior authorization    2.  Stop lisinopril 2 full days prior to stating entresto.    3.  Stop Isordil (isosorbide) before using viagra.    4.  May use viagra 50 mg tablet - take 1/2 tab (25 mg) one hour prior to activity as needed.    5.  Blood work about one week after starting the entresto.    6.  If you tolerate the entresto, about a month after you are on the low dose, we may increase the dose.      For questions regarding your appointment today please call [phone] and press option #1 for University, then option #3 to speak with a nurse.        
None

## 2018-07-18 NOTE — BRIEF OPERATIVE NOTE - PROCEDURE
<<-----Click on this checkbox to enter Procedure Gastrectomy, sleeve, laparoscopic  07/18/2018    Active  JENS

## 2018-07-18 NOTE — BRIEF OPERATIVE NOTE - OPERATION/FINDINGS
Sleeve gastrectomy performed over 36-Fr suction-calibration tube.  Omentopexy performed. Staple line reinforced with Evicel.  No leak on saline test.

## 2018-07-18 NOTE — CHART NOTE - NSCHARTNOTEFT_GEN_A_CORE
Post Operative Note    Time out of OR: 9:22    Time of Post Operative Check: 1:20    Procedure: Laparoscopic Sleeve Gastrectomy    Subjective: Patient seen and examined at bedside. She is complaining of nausea and spitting up clear/yellow fluid. She denies SOB. Her pain is tolerable with her medications. She has voided but not yet ambulated    Objective:    T(C): 36.5 (07-18-18 @ 09:18), Max: 36.7 (07-18-18 @ 07:04)  HR: 98 (07-18-18 @ 13:00) (84 - 98)  BP: 123/83 (07-18-18 @ 12:30) (117/68 - 181/105)  RR: 16 (07-18-18 @ 12:30) (16 - 20)  SpO2: 98% (07-18-18 @ 13:00) (92% - 100%)      07-18-18 @ 07:01  -  07-18-18 @ 13:20  --------------------------------------------------------  IN: 1150 mL / OUT: 300 mL / NET: 850 mL        Physical Exam:  General: NAD, tired appearing  Abd: soft, appropriately tender to palpation, not distended. Steri c/d/i  Ext: SCDs in place    Assessment/Plan:  37F s/p Laparoscopic Sleeve Gastrectomy    - nausea control  - pain control  - DVT ppx  - Ambulate  - LATOYA management   - CLD in 2 hours

## 2018-07-19 ENCOUNTER — TRANSCRIPTION ENCOUNTER (OUTPATIENT)
Age: 38
End: 2018-07-19

## 2018-07-19 LAB
ANION GAP SERPL CALC-SCNC: 13 MMOL/L — SIGNIFICANT CHANGE UP (ref 5–17)
BASOPHILS # BLD AUTO: 0 K/UL — SIGNIFICANT CHANGE UP (ref 0–0.2)
BASOPHILS NFR BLD AUTO: 0.3 % — SIGNIFICANT CHANGE UP (ref 0–2)
BUN SERPL-MCNC: 5 MG/DL — LOW (ref 7–23)
CALCIUM SERPL-MCNC: 7.9 MG/DL — LOW (ref 8.4–10.5)
CHLORIDE SERPL-SCNC: 98 MMOL/L — SIGNIFICANT CHANGE UP (ref 96–108)
CO2 SERPL-SCNC: 27 MMOL/L — SIGNIFICANT CHANGE UP (ref 22–31)
CREAT SERPL-MCNC: 0.54 MG/DL — SIGNIFICANT CHANGE UP (ref 0.5–1.3)
EOSINOPHIL # BLD AUTO: 0 K/UL — SIGNIFICANT CHANGE UP (ref 0–0.5)
EOSINOPHIL NFR BLD AUTO: 0.1 % — SIGNIFICANT CHANGE UP (ref 0–6)
GLUCOSE BLDC GLUCOMTR-MCNC: 127 MG/DL — HIGH (ref 70–99)
GLUCOSE BLDC GLUCOMTR-MCNC: 131 MG/DL — HIGH (ref 70–99)
GLUCOSE BLDC GLUCOMTR-MCNC: 133 MG/DL — HIGH (ref 70–99)
GLUCOSE BLDC GLUCOMTR-MCNC: 138 MG/DL — HIGH (ref 70–99)
GLUCOSE BLDC GLUCOMTR-MCNC: 158 MG/DL — HIGH (ref 70–99)
GLUCOSE SERPL-MCNC: 125 MG/DL — HIGH (ref 70–99)
HCT VFR BLD CALC: 25.4 % — LOW (ref 34.5–45)
HCT VFR BLD CALC: 26.6 % — LOW (ref 34.5–45)
HCT VFR BLD CALC: 26.6 % — LOW (ref 34.5–45)
HGB BLD-MCNC: 8.7 G/DL — LOW (ref 11.5–15.5)
HGB BLD-MCNC: 8.7 G/DL — LOW (ref 11.5–15.5)
HGB BLD-MCNC: 9 G/DL — LOW (ref 11.5–15.5)
LYMPHOCYTES # BLD AUTO: 2.6 K/UL — SIGNIFICANT CHANGE UP (ref 1–3.3)
LYMPHOCYTES # BLD AUTO: 26.1 % — SIGNIFICANT CHANGE UP (ref 13–44)
MAGNESIUM SERPL-MCNC: 1.9 MG/DL — SIGNIFICANT CHANGE UP (ref 1.6–2.6)
MCHC RBC-ENTMCNC: 27.8 PG — SIGNIFICANT CHANGE UP (ref 27–34)
MCHC RBC-ENTMCNC: 29 PG — SIGNIFICANT CHANGE UP (ref 27–34)
MCHC RBC-ENTMCNC: 29.2 PG — SIGNIFICANT CHANGE UP (ref 27–34)
MCHC RBC-ENTMCNC: 32.8 GM/DL — SIGNIFICANT CHANGE UP (ref 32–36)
MCHC RBC-ENTMCNC: 34 GM/DL — SIGNIFICANT CHANGE UP (ref 32–36)
MCHC RBC-ENTMCNC: 34.1 GM/DL — SIGNIFICANT CHANGE UP (ref 32–36)
MCV RBC AUTO: 84.9 FL — SIGNIFICANT CHANGE UP (ref 80–100)
MCV RBC AUTO: 85 FL — SIGNIFICANT CHANGE UP (ref 80–100)
MCV RBC AUTO: 85.9 FL — SIGNIFICANT CHANGE UP (ref 80–100)
MONOCYTES # BLD AUTO: 0.4 K/UL — SIGNIFICANT CHANGE UP (ref 0–0.9)
MONOCYTES NFR BLD AUTO: 4.1 % — SIGNIFICANT CHANGE UP (ref 2–14)
NEUTROPHILS # BLD AUTO: 6.9 K/UL — SIGNIFICANT CHANGE UP (ref 1.8–7.4)
NEUTROPHILS NFR BLD AUTO: 69.5 % — SIGNIFICANT CHANGE UP (ref 43–77)
PHOSPHATE SERPL-MCNC: 1.6 MG/DL — LOW (ref 2.5–4.5)
PLATELET # BLD AUTO: 245 K/UL — SIGNIFICANT CHANGE UP (ref 150–400)
PLATELET # BLD AUTO: 259 K/UL — SIGNIFICANT CHANGE UP (ref 150–400)
PLATELET # BLD AUTO: 275 K/UL — SIGNIFICANT CHANGE UP (ref 150–400)
POTASSIUM SERPL-MCNC: 3.1 MMOL/L — LOW (ref 3.5–5.3)
POTASSIUM SERPL-SCNC: 3.1 MMOL/L — LOW (ref 3.5–5.3)
RBC # BLD: 2.99 M/UL — LOW (ref 3.8–5.2)
RBC # BLD: 3.1 M/UL — LOW (ref 3.8–5.2)
RBC # BLD: 3.13 M/UL — LOW (ref 3.8–5.2)
RBC # FLD: 12.7 % — SIGNIFICANT CHANGE UP (ref 10.3–14.5)
RBC # FLD: 12.8 % — SIGNIFICANT CHANGE UP (ref 10.3–14.5)
RBC # FLD: 12.9 % — SIGNIFICANT CHANGE UP (ref 10.3–14.5)
SODIUM SERPL-SCNC: 138 MMOL/L — SIGNIFICANT CHANGE UP (ref 135–145)
WBC # BLD: 10.8 K/UL — HIGH (ref 3.8–10.5)
WBC # BLD: 9.7 K/UL — SIGNIFICANT CHANGE UP (ref 3.8–10.5)
WBC # BLD: 9.9 K/UL — SIGNIFICANT CHANGE UP (ref 3.8–10.5)
WBC # FLD AUTO: 10.8 K/UL — HIGH (ref 3.8–10.5)
WBC # FLD AUTO: 9.7 K/UL — SIGNIFICANT CHANGE UP (ref 3.8–10.5)
WBC # FLD AUTO: 9.9 K/UL — SIGNIFICANT CHANGE UP (ref 3.8–10.5)

## 2018-07-19 PROCEDURE — 71045 X-RAY EXAM CHEST 1 VIEW: CPT | Mod: 26

## 2018-07-19 PROCEDURE — 74241: CPT | Mod: 26

## 2018-07-19 RX ORDER — OXYCODONE HYDROCHLORIDE 5 MG/1
5 TABLET ORAL EVERY 4 HOURS
Qty: 0 | Refills: 0 | Status: DISCONTINUED | OUTPATIENT
Start: 2018-07-19 | End: 2018-07-21

## 2018-07-19 RX ORDER — METOCLOPRAMIDE HCL 10 MG
10 TABLET ORAL ONCE
Qty: 0 | Refills: 0 | Status: COMPLETED | OUTPATIENT
Start: 2018-07-19 | End: 2018-07-19

## 2018-07-19 RX ORDER — HYDROCORTISONE 20 MG
25 TABLET ORAL EVERY 8 HOURS
Qty: 0 | Refills: 0 | Status: COMPLETED | OUTPATIENT
Start: 2018-07-20 | End: 2018-07-20

## 2018-07-19 RX ORDER — OMEPRAZOLE 10 MG/1
1 CAPSULE, DELAYED RELEASE ORAL
Qty: 30 | Refills: 0 | OUTPATIENT
Start: 2018-07-19 | End: 2018-08-17

## 2018-07-19 RX ORDER — ONDANSETRON 8 MG/1
1 TABLET, FILM COATED ORAL
Qty: 28 | Refills: 0 | OUTPATIENT
Start: 2018-07-19 | End: 2018-07-25

## 2018-07-19 RX ORDER — HYDROCORTISONE 20 MG
100 TABLET ORAL EVERY 8 HOURS
Qty: 0 | Refills: 0 | Status: COMPLETED | OUTPATIENT
Start: 2018-07-19 | End: 2018-07-19

## 2018-07-19 RX ORDER — OXYCODONE HYDROCHLORIDE 5 MG/1
5 TABLET ORAL
Qty: 120 | Refills: 0 | OUTPATIENT
Start: 2018-07-19

## 2018-07-19 RX ORDER — METOCLOPRAMIDE HCL 10 MG
10 TABLET ORAL EVERY 6 HOURS
Qty: 0 | Refills: 0 | Status: DISCONTINUED | OUTPATIENT
Start: 2018-07-19 | End: 2018-07-21

## 2018-07-19 RX ORDER — GLIMEPIRIDE 1 MG
1 TABLET ORAL
Qty: 0 | Refills: 0 | COMMUNITY

## 2018-07-19 RX ORDER — SUCRALFATE 1 G
1 TABLET ORAL
Qty: 0 | Refills: 0 | Status: DISCONTINUED | OUTPATIENT
Start: 2018-07-19 | End: 2018-07-21

## 2018-07-19 RX ORDER — ACETAMINOPHEN 500 MG
1000 TABLET ORAL ONCE
Qty: 0 | Refills: 0 | Status: COMPLETED | OUTPATIENT
Start: 2018-07-19 | End: 2018-07-19

## 2018-07-19 RX ORDER — HYOSCYAMINE SULFATE 0.13 MG
1 TABLET ORAL
Qty: 28 | Refills: 0 | OUTPATIENT
Start: 2018-07-19 | End: 2018-07-25

## 2018-07-19 RX ORDER — POTASSIUM PHOSPHATE, MONOBASIC POTASSIUM PHOSPHATE, DIBASIC 236; 224 MG/ML; MG/ML
15 INJECTION, SOLUTION INTRAVENOUS ONCE
Qty: 0 | Refills: 0 | Status: COMPLETED | OUTPATIENT
Start: 2018-07-19 | End: 2018-07-19

## 2018-07-19 RX ORDER — HYDROCORTISONE 20 MG
50 TABLET ORAL EVERY 8 HOURS
Qty: 0 | Refills: 0 | Status: COMPLETED | OUTPATIENT
Start: 2018-07-19 | End: 2018-07-19

## 2018-07-19 RX ORDER — SUCRALFATE 1 G
1 TABLET ORAL
Qty: 0 | Refills: 0 | Status: DISCONTINUED | OUTPATIENT
Start: 2018-07-19 | End: 2018-07-19

## 2018-07-19 RX ADMIN — Medication 50 MILLIGRAM(S): at 22:09

## 2018-07-19 RX ADMIN — Medication 0.12 MILLIGRAM(S): at 05:38

## 2018-07-19 RX ADMIN — Medication 400 MILLIGRAM(S): at 02:47

## 2018-07-19 RX ADMIN — POTASSIUM PHOSPHATE, MONOBASIC POTASSIUM PHOSPHATE, DIBASIC 62.5 MILLIMOLE(S): 236; 224 INJECTION, SOLUTION INTRAVENOUS at 11:30

## 2018-07-19 RX ADMIN — Medication 30 MILLIGRAM(S): at 05:41

## 2018-07-19 RX ADMIN — Medication 10 MILLIGRAM(S): at 22:09

## 2018-07-19 RX ADMIN — Medication 2: at 01:00

## 2018-07-19 RX ADMIN — ONDANSETRON 4 MILLIGRAM(S): 8 TABLET, FILM COATED ORAL at 11:30

## 2018-07-19 RX ADMIN — Medication 400 MILLIGRAM(S): at 20:06

## 2018-07-19 RX ADMIN — Medication 30 MILLIGRAM(S): at 06:10

## 2018-07-19 RX ADMIN — Medication 1 GRAM(S): at 22:10

## 2018-07-19 RX ADMIN — Medication 10 MILLIGRAM(S): at 15:35

## 2018-07-19 RX ADMIN — Medication 100 MILLIEQUIVALENT(S): at 01:01

## 2018-07-19 RX ADMIN — OXYCODONE HYDROCHLORIDE 5 MILLIGRAM(S): 5 TABLET ORAL at 08:23

## 2018-07-19 RX ADMIN — Medication 100 MILLIEQUIVALENT(S): at 02:47

## 2018-07-19 RX ADMIN — Medication 100 MILLIGRAM(S): at 14:51

## 2018-07-19 RX ADMIN — HEPARIN SODIUM 5000 UNIT(S): 5000 INJECTION INTRAVENOUS; SUBCUTANEOUS at 05:38

## 2018-07-19 RX ADMIN — SODIUM CHLORIDE 150 MILLILITER(S): 9 INJECTION, SOLUTION INTRAVENOUS at 05:38

## 2018-07-19 RX ADMIN — PANTOPRAZOLE SODIUM 40 MILLIGRAM(S): 20 TABLET, DELAYED RELEASE ORAL at 11:30

## 2018-07-19 RX ADMIN — ONDANSETRON 4 MILLIGRAM(S): 8 TABLET, FILM COATED ORAL at 19:04

## 2018-07-19 RX ADMIN — HEPARIN SODIUM 5000 UNIT(S): 5000 INJECTION INTRAVENOUS; SUBCUTANEOUS at 22:10

## 2018-07-19 RX ADMIN — Medication 1000 MILLIGRAM(S): at 03:15

## 2018-07-19 RX ADMIN — ONDANSETRON 4 MILLIGRAM(S): 8 TABLET, FILM COATED ORAL at 05:38

## 2018-07-19 RX ADMIN — OXYCODONE HYDROCHLORIDE 5 MILLIGRAM(S): 5 TABLET ORAL at 08:53

## 2018-07-19 NOTE — PROGRESS NOTE ADULT - SUBJECTIVE AND OBJECTIVE BOX
Post Op Day#: 1    Subjective: Im doing well, mild abdominal discomfort, no pain"     Objective: No acute events overnight OOB ambulated independently last evening. Bedside continuous pulse oximetry at bedside functioning,  v/s stable, afebrile. Labs within acceptable range.  Denies nausea and vomiting, voiding. SS for glucose monitoring and control. Hypokalemia (see lab below)                                              Vital Signs Last 24 Hrs  T(C): 37.2 (19 Jul 2018 06:58), Max: 38.2 (18 Jul 2018 20:06)  T(F): 98.9 (19 Jul 2018 06:58), Max: 100.8 (18 Jul 2018 20:06)  HR: 81 (19 Jul 2018 06:58) (73 - 107)  BP: 133/85 (19 Jul 2018 06:58) (110/58 - 181/105)  BP(mean): 105 (18 Jul 2018 17:00) (79 - 136)  RR: 18 (19 Jul 2018 06:58) (16 - 18)  SpO2: 98% (19 Jul 2018 06:58) (92% - 100%)                                               I&O's Summary    18 Jul 2018 07:01  -  19 Jul 2018 07:00  --------------------------------------------------------  IN: 4150 mL / OUT: 2125 mL / NET: 2025 mL                                                                          12.9   15.1  )-----------( 302      ( 18 Jul 2018 10:02 )             39.2                                                 07-18    138  |  95<L>  |  5<L>  ----------------------------<  208<H>  3.4<L>   |  25  |  0.48<L>    Ca    7.9<L>      18 Jul 2018 15:34  Phos  3.4     07-18  Mg     2.1     07-18      aluminum hydroxide/magnesium hydroxide/simethicone Suspension 30 milliLiter(s) Oral every 4 hours PRN  ceFAZolin   IVPB 2000 milliGRAM(s) IV Intermittent once  dextrose 40% Gel 15 Gram(s) Oral once PRN  dextrose 5%. 1000 milliLiter(s) IV Continuous <Continuous>  dextrose 50% Injectable 12.5 Gram(s) IV Push once  dextrose 50% Injectable 25 Gram(s) IV Push once  dextrose 50% Injectable 25 Gram(s) IV Push once  glucagon  Injectable 1 milliGRAM(s) IntraMuscular once PRN  heparin  Injectable 5000 Unit(s) SubCutaneous every 8 hours  insulin lispro (HumaLOG) corrective regimen sliding scale   SubCutaneous every 6 hours  lactated ringers. 1000 milliLiter(s) IV Continuous <Continuous>  ondansetron Injectable 4 milliGRAM(s) IV Push every 6 hours  oxyCODONE    Solution 5 milliGRAM(s) Oral every 4 hours PRN  pantoprazole  Injectable 40 milliGRAM(s) IV Push daily      Physical Exam:         Lungs:  clear breath sounds b/l       Heart:  Regular rate & rhythm       Abdomen:  Soft, non-distended.  Scopes sites clean, dry and intact. + bs, - flatus, no rebound or guarding       Skin:  intact, pannus w/o rash       Extremities: + pulses, no edema, no calf tenderness, negative derrek's     VTE Risk Assessment Scores             Caprini VTE Risk Score:                                                     3-4  ( moderate), Perioperative and Postoperative VTE prophylaxis   [facility]  VTE RISK SCORE:  <1% (Low)Perioperative and Postoperative VTE prophylaxis , No extended postoperative VTE prophylaxis      Assessment and Plan: S/P Lap Sleeve Gastrectomy    - Correct Hypokalemia  - Bariatric Clear diet today then protocol derived staged meal progression supervised by RD in outpatient setting  - DVT/GI prophylaxis, Incentive spirometry  - Ambulate Q 2 hours or as indicated  - Diabetes education, to monitor FS at home and notify prescriber for low/high blood glucose  -  Procedure specific education including diet, vitamins and VTE prevention. Written materials given  - Medication reviewed and reconciled, Bariatric meds obtained from [facility] prior to d/c  -  D/C home once Bariatric 8-Point d/c criteria met  -  Follow up with Dr Mason in 7-10 days, Dietitian and PMD in 30 days.      Senia Call, CARLOS, ANP  [phone] Post Op Day#: 1    Subjective: Im doing well, mild abdominal discomfort, no pain"     Objective: No acute events overnight OOB ambulated independently last evening. Bedside continuous pulse oximetry at bedside functioning,  v/s stable, afebrile. Labs within acceptable range.  Denies nausea and vomiting, voiding. SS for glucose monitoring and control, (POCT ). Hypokalemia (see lab below)                                              Vital Signs Last 24 Hrs  T(C): 37.2 (19 Jul 2018 06:58), Max: 38.2 (18 Jul 2018 20:06)  T(F): 98.9 (19 Jul 2018 06:58), Max: 100.8 (18 Jul 2018 20:06)  HR: 81 (19 Jul 2018 06:58) (73 - 107)  BP: 133/85 (19 Jul 2018 06:58) (110/58 - 181/105)  BP(mean): 105 (18 Jul 2018 17:00) (79 - 136)  RR: 18 (19 Jul 2018 06:58) (16 - 18)  SpO2: 98% (19 Jul 2018 06:58) (92% - 100%)                                               I&O's Summary    18 Jul 2018 07:01  -  19 Jul 2018 07:00  --------------------------------------------------------  IN: 4150 mL / OUT: 2125 mL / NET: 2025 mL                                                                          12.9   15.1  )-----------( 302      ( 18 Jul 2018 10:02 )             39.2                                                 07-18    138  |  95<L>  |  5<L>  ----------------------------<  208<H>  3.4<L>   |  25  |  0.48<L>    Ca    7.9<L>      18 Jul 2018 15:34  Phos  3.4     07-18  Mg     2.1     07-18      aluminum hydroxide/magnesium hydroxide/simethicone Suspension 30 milliLiter(s) Oral every 4 hours PRN  ceFAZolin   IVPB 2000 milliGRAM(s) IV Intermittent once  dextrose 40% Gel 15 Gram(s) Oral once PRN  dextrose 5%. 1000 milliLiter(s) IV Continuous <Continuous>  dextrose 50% Injectable 12.5 Gram(s) IV Push once  dextrose 50% Injectable 25 Gram(s) IV Push once  dextrose 50% Injectable 25 Gram(s) IV Push once  glucagon  Injectable 1 milliGRAM(s) IntraMuscular once PRN  heparin  Injectable 5000 Unit(s) SubCutaneous every 8 hours  insulin lispro (HumaLOG) corrective regimen sliding scale   SubCutaneous every 6 hours  lactated ringers. 1000 milliLiter(s) IV Continuous <Continuous>  ondansetron Injectable 4 milliGRAM(s) IV Push every 6 hours  oxyCODONE    Solution 5 milliGRAM(s) Oral every 4 hours PRN  pantoprazole  Injectable 40 milliGRAM(s) IV Push daily      Physical Exam:         Lungs:  clear breath sounds b/l       Heart:  Regular rate & rhythm       Abdomen:  Soft, non-distended.  Scopes sites clean, dry and intact. + bs, - flatus, no rebound or guarding       Skin:  intact, pannus w/o rash       Extremities: + pulses, no edema, no calf tenderness, negative derrek's     VTE Risk Assessment Scores             Caprini VTE Risk Score:                                                     3-4  ( moderate), Perioperative and Postoperative VTE prophylaxis   [facility]  VTE RISK SCORE:  <1% (Low)Perioperative and Postoperative VTE prophylaxis , No extended postoperative VTE prophylaxis      Assessment and Plan: S/P Lap Sleeve Gastrectomy    - Correct Hypokalemia  - Bariatric Clear diet today then protocol derived staged meal progression supervised by RD in outpatient setting  - DVT/GI prophylaxis, Incentive spirometry  - Ambulate Q 2 hours or as indicated  - Diabetes education, to monitor FS at home and notify prescriber for low/high blood glucose  -  Procedure specific education including diet, vitamins and VTE prevention. Written materials given  - Medication reviewed and reconciled, Bariatric meds obtained from [facility] prior to d/c  -  D/C home once Bariatric 8-Point d/c criteria met  -  Follow up with Dr Mason in 7-10 days, Dietitian and PMD in 30 days.      Senia Call, CARLOS, ANP  [phone]

## 2018-07-19 NOTE — DIETITIAN INITIAL EVALUATION ADULT. - ADHERENCE
Pt reports following a full liquid diet for 2 weeks PTA as instructed by outpatient RD. Pt reports having Premier Protein shakes and yougrt. Pt denies micronutrient supplementation PTA. NKFA./good

## 2018-07-19 NOTE — DISCHARGE NOTE ADULT - PATIENT PORTAL LINK FT
You can access the [facility], offered by [facility], by registering with the following website: [email or web address]

## 2018-07-19 NOTE — DISCHARGE NOTE ADULT - MEDICATION SUMMARY - MEDICATIONS TO STOP TAKING
I will STOP taking the medications listed below when I get home from the hospital:    hydroCHLOROthiazide 25 mg oral tablet  -- 1 tab(s) by mouth once a day - discontinue after surgery    glimepiride 4 mg oral tablet  -- 1 tab(s) by mouth once a day - discontinue after surgery I will STOP taking the medications listed below when I get home from the hospital:  None

## 2018-07-19 NOTE — PROGRESS NOTE ADULT - SUBJECTIVE AND OBJECTIVE BOX
Bariatric Surgery Progress Note    Post Op Day# 1:     24 hr events/Subjective:     No acute issues overnight  Pain controlled with medications  Nausea controlled with anti-ematics   Voiding      Procedure:  Gastrectomy, sleeve, laparoscopic      Vital Signs Last 24 Hrs  T(C): 37.4 (19 Jul 2018 01:33), Max: 38.2 (18 Jul 2018 20:06)  T(F): 99.4 (19 Jul 2018 01:33), Max: 100.8 (18 Jul 2018 20:06)  HR: 99 (19 Jul 2018 01:33) (75 - 107)  BP: 135/81 (19 Jul 2018 01:33) (110/58 - 181/105)  BP(mean): 105 (18 Jul 2018 17:00) (79 - 136)  RR: 18 (19 Jul 2018 01:33) (16 - 20)  SpO2: 100% (19 Jul 2018 01:33) (92% - 100%)  Height (cm): 157.48 (07-18 @ 07:04)  Weight (kg): 111.5 (07-18 @ 07:04)  BMI (kg/m2): 45 (07-18 @ 07:04)  BSA (m2): 2.09 (07-18 @ 07:04)  I&O's Summary    18 Jul 2018 07:01  -  19 Jul 2018 05:12  --------------------------------------------------------  IN: 2450 mL / OUT: 1875 mL / NET: 575 mL      I&O's Detail    18 Jul 2018 07:01  -  19 Jul 2018 05:12  --------------------------------------------------------  IN:    IV PiggyBack: 300 mL    lactated ringers.: 900 mL    multivitamin/thiamine/folic acid in sodium chloride 0.9%: 1250 mL  Total IN: 2450 mL    OUT:    Voided: 1875 mL  Total OUT: 1875 mL    Total NET: 575 mL          Physical Exam:    General:  Appears stated age, well-groomed, well-nourished, no distress  Chest:  clear breath sounds  Cardiovascular:  Regular rate & rhythm  Abdomen:  Soft, incisions clean, dry intact, tenderness around incisions, no rebound or guarding  Skin:  No rash  Neuro/Psych:  Alert, oriented to time, place and person                           12.9   15.1  )-----------( 302      ( 18 Jul 2018 10:02 )             39.2       07-18    138  |  95<L>  |  5<L>  ----------------------------<  208<H>  3.4<L>   |  25  |  0.48<L>    Ca    7.9<L>      18 Jul 2018 15:34  Phos  3.4     07-18  Mg     2.1     07-18        aluminum hydroxide/magnesium hydroxide/simethicone Suspension milliLiter(s) Oral every 4 hours PRN  ceFAZolin   IVPB milliGRAM(s) IV Intermittent once  dextrose 40% Gel Gram(s) Oral once PRN  dextrose 5%. milliLiter(s) IV Continuous <Continuous>  dextrose 50% Injectable Gram(s) IV Push once  dextrose 50% Injectable Gram(s) IV Push once  dextrose 50% Injectable Gram(s) IV Push once  glucagon  Injectable milliGRAM(s) IntraMuscular once PRN  heparin  Injectable Unit(s) SubCutaneous every 8 hours  hyoscyamine SL milliGRAM(s) SubLingual every 6 hours  insulin lispro (HumaLOG) corrective regimen sliding scale  SubCutaneous every 6 hours  ketorolac   Injectable milliGRAM(s) IV Push every 6 hours  lactated ringers. milliLiter(s) IV Continuous <Continuous>  ondansetron Injectable milliGRAM(s) IV Push every 6 hours  pantoprazole  Injectable milliGRAM(s) IV Push daily          Assessment and Plan:  37y y/o Female s/p Gastrectomy, sleeve, laparoscopic  , POD # 1     - Start PO liquid oxycodone PRN for pain  - Continue ambulation   - Encourage Incentive Spirometer use  - Bariatric clears   - Continue chemical and mechanical DVT prophylaxis  - Discharge home once tolerating adequate PO and 8 point discharge criteria met    Discuss with attending Bariatric Surgery Progress Note    Post Op Day# 1:     24 hr events/Subjective:     No acute issues overnight  Pain controlled with medications  Nausea controlled with anti-emetics   Voiding      Procedure:  Gastrectomy, sleeve, laparoscopic      Vital Signs Last 24 Hrs  T(C): 37.4 (19 Jul 2018 01:33), Max: 38.2 (18 Jul 2018 20:06)  T(F): 99.4 (19 Jul 2018 01:33), Max: 100.8 (18 Jul 2018 20:06)  HR: 99 (19 Jul 2018 01:33) (75 - 107)  BP: 135/81 (19 Jul 2018 01:33) (110/58 - 181/105)  BP(mean): 105 (18 Jul 2018 17:00) (79 - 136)  RR: 18 (19 Jul 2018 01:33) (16 - 20)  SpO2: 100% (19 Jul 2018 01:33) (92% - 100%)  Height (cm): 157.48 (07-18 @ 07:04)  Weight (kg): 111.5 (07-18 @ 07:04)  BMI (kg/m2): 45 (07-18 @ 07:04)  BSA (m2): 2.09 (07-18 @ 07:04)  I&O's Summary    18 Jul 2018 07:01  -  19 Jul 2018 05:12  --------------------------------------------------------  IN: 2450 mL / OUT: 1875 mL / NET: 575 mL      I&O's Detail    18 Jul 2018 07:01  -  19 Jul 2018 05:12  --------------------------------------------------------  IN:    IV PiggyBack: 300 mL    lactated ringers.: 900 mL    multivitamin/thiamine/folic acid in sodium chloride 0.9%: 1250 mL  Total IN: 2450 mL    OUT:    Voided: 1875 mL  Total OUT: 1875 mL    Total NET: 575 mL          Physical Exam:    General:  Appears stated age, well-groomed, well-nourished, no distress  Chest:  clear breath sounds  Cardiovascular:  Regular rate & rhythm  Abdomen:  Soft, incisions clean, dry intact, tenderness around incisions, no rebound or guarding  Skin:  No rash  Neuro/Psych:  Alert, oriented to time, place and person                           12.9   15.1  )-----------( 302      ( 18 Jul 2018 10:02 )             39.2       07-18    138  |  95<L>  |  5<L>  ----------------------------<  208<H>  3.4<L>   |  25  |  0.48<L>    Ca    7.9<L>      18 Jul 2018 15:34  Phos  3.4     07-18  Mg     2.1     07-18        aluminum hydroxide/magnesium hydroxide/simethicone Suspension milliLiter(s) Oral every 4 hours PRN  ceFAZolin   IVPB milliGRAM(s) IV Intermittent once  dextrose 40% Gel Gram(s) Oral once PRN  dextrose 5%. milliLiter(s) IV Continuous <Continuous>  dextrose 50% Injectable Gram(s) IV Push once  dextrose 50% Injectable Gram(s) IV Push once  dextrose 50% Injectable Gram(s) IV Push once  glucagon  Injectable milliGRAM(s) IntraMuscular once PRN  heparin  Injectable Unit(s) SubCutaneous every 8 hours  hyoscyamine SL milliGRAM(s) SubLingual every 6 hours  insulin lispro (HumaLOG) corrective regimen sliding scale  SubCutaneous every 6 hours  ketorolac   Injectable milliGRAM(s) IV Push every 6 hours  lactated ringers. milliLiter(s) IV Continuous <Continuous>  ondansetron Injectable milliGRAM(s) IV Push every 6 hours  pantoprazole  Injectable milliGRAM(s) IV Push daily          Assessment and Plan:  37y y/o Female s/p Gastrectomy, sleeve, laparoscopic  , POD # 1     - Start PO liquid oxycodone PRN for pain  - Continue ambulation   - Encourage Incentive Spirometer use  - Bariatric clears   - Continue chemical and mechanical DVT prophylaxis  - Discharge home once tolerating adequate PO and 8 point discharge criteria met    Discuss with attending

## 2018-07-19 NOTE — DIETITIAN INITIAL EVALUATION ADULT. - OTHER INFO
Pt seen for bariatric surgery consult on 2MON. Pt with DM, reports checking SMBG 2x daily. Pt with multiple previous wt loss attempts per chart. Pt tried various diet programs and was unable to lose and maintain significant wt loss. Per chart, pt met with outpatient RD and weighed 265 pounds (9/8/17). Pre-surgical wt (H&P) noted as 250 pounds (7/10/18). Current wt of 245.9 pounds (7/18/18). Pt now S/P laparoscopic vertical sleeve gastrectomy. Pt denies N+V, sipping on bariatric clear liquids during RD visit. Pt with knowledge of bariatric full liquid diet and receptive to in depth review/reinforcement. Pt reports home stock of protein shakes with plans to purchase more. Pt reports purchasing the necessary vitamins/minerals in chewable form including a multivitamin with added elemental iron, calcium citrate with vitamin D, and sublingual B12. Pt was advised to take the multivitamin with elemental iron at least 2 hours apart from the calcium citrate with vitamin D for optimal absorption. Pt plans to schedule a follow up appointment with outpatient RD. Pt able to teach back all points discussed during interview.

## 2018-07-19 NOTE — DISCHARGE NOTE ADULT - INSTRUCTIONS
Continue bariatric clear liquid for today, then Bariatric full liquid diet for 2 weeks as explained by your dietitian. Follow up with your dietitian in 30 days.  No heavy lifting/straining, do not drive or operate machinery, no driving while taking narcotic pain medication.

## 2018-07-19 NOTE — DIETITIAN INITIAL EVALUATION ADULT. - NS FNS WEIGHT USED FOR CALC
ideal/110 pounds, 6 cups of fluid (48 ounces) to start working up to at least 8 cups (64 ounces) per day

## 2018-07-19 NOTE — DISCHARGE NOTE ADULT - CARE PLAN
Principal Discharge DX:	Morbid obesity with BMI of 40.0-44.9, adult  Goal:	achieve a healthy lifestyle  Assessment and plan of treatment:	Call office for nausea, vomiting, fever, abdominal pain and drainage from surgical sites. If you develop shortness of breath, difficulty breathing, chest pain, calf pain with swelling visit your closest emergency room

## 2018-07-19 NOTE — PROVIDER CONTACT NOTE (OTHER) - ASSESSMENT
Pt warm to touch, denies chills + chest pain
Pt A&Ox4. VSS except HR Low 100s. Pt c/o of "terrible Pain" while touching abd, pain upon palpation and distended. Pt denies chest pain, SOB, difficulty breathing, headache or lightheadedness. Pt states she was unable to drink overnight but has ambulated around unit.
vss

## 2018-07-19 NOTE — DISCHARGE NOTE ADULT - PRINCIPAL DIAGNOSIS
Normal vision: sees adequately in most situations; can see medication labels, newsprint
Morbid obesity with BMI of 40.0-44.9, adult

## 2018-07-19 NOTE — DISCHARGE NOTE ADULT - HOSPITAL COURSE
On July 18 2018, Mrs. Copeland underwent Laparoscopic Sleeve Gastrectomy for morbid obesity, BMI 45. She received VTE and SSI prophylaxis prior to start of procedure and throughout her hospital course as indicated. Indwelling dodson catheter placed following induction. Procedure went as planned, extubated in the OR, indwelling dodson removed then subsequently taken to the recovery room. Postoperatively her pain was managed with opioid and non-opioid analgesia. Once hemodynamically stable she ambulated with assistance and was later transferred to the bariatric unit and placed on bedside continuous pulse oximetry. She was able to void without difficulty once dodson removed same day of surgery.  She began Bariatric clear liquid diet evening of surgery.  	  On POD#1 she remained hemodynamically stable and tolerating increase bariatric liquid diet. She is ambulating independently, comfortable and has effective pain control. The rest of her hospital course was uneventful and she was cleared for discharge. Procedure specific written discharge instructions explained, written materials given to include signs and symptoms of postop complications and VTE prevention. She was instructed to follow a protocol-derived staged meal progression supervised by her dietician. She will follow up with Dr Mason in 7-10 days with subsequent visits at one, three and six months, then annually. She was also instructed to follow up with her dietician and PMD in 30 days On July 18 2018, Mrs. Copeland underwent Laparoscopic Sleeve Gastrectomy for morbid obesity, BMI 45. She received VTE and SSI prophylaxis prior to start of procedure and throughout her hospital course as indicated. Indwelling dodson catheter placed following induction which was removed at end of procedure.. Procedure went as planned, extubated in the OR, indwelling dodson removed then subsequently taken to the recovery room. Postoperatively her pain was managed with opioid and non-opioid analgesia. Once hemodynamically stable she ambulated with assistance and was later transferred to the bariatric unit and placed on bedside continuous pulse oximetry. She was able to void without difficulty once dodson removed same day of surgery.  She began Bariatric sips of clear liquid diet evening of surgery.  	  On POD#1 she remained hemodynamically stable and tolerating increase bariatric liquid diet. She is ambulating independently, comfortable and has effective pain control. The rest of her hospital course was uneventful and she was cleared for discharge. Procedure specific written discharge instructions explained, written materials given to include signs and symptoms of postop complications and VTE prevention. She was instructed to follow a protocol-derived staged meal progression supervised by her dietician. She will follow up with Dr Mason in 7-10 days with subsequent visits at one, three and six months, then annually. She was also instructed to follow up with her dietician and PMD in 30 days On July 18 2018, Mrs. Copeland underwent Laparoscopic Sleeve Gastrectomy for morbid obesity, BMI 45. She received VTE and SSI prophylaxis prior to start of procedure and throughout her hospital course as indicated. Indwelling dodson catheter placed following induction which was removed at end of procedure.. Procedure went as planned, extubated in the OR, indwelling dodson removed then subsequently taken to the recovery room. Postoperatively her pain was managed with opioid and non-opioid analgesia. Once hemodynamically stable she ambulated with assistance and was later transferred to the bariatric unit and placed on bedside continuous pulse oximetry. She was able to void without difficulty once dodson removed same day of surgery.  She began Bariatric sips of clear liquid diet evening of surgery.  	  On POD#1 she had mild  tachycardia but remained asymptomatic w/o sob, dizziness, pt up and ambulating.  She also experienced N/V after drinking liquid UGI obtained and within normal limit, no leak, no obstruction with reflux. Later in the morning, labs revealed low H&H at which time she complained of dizziness.  BP remained wnl. Serial HH monitored over the 24 hours witch showed improved.  Antiemetic and PPI maximized, with subsequent increase in PO intake. She is ambulating independently around the unit.  She became febrile at night 100.4.    On POD#2 the patient remained febrile at 100.3. Other v/s within normal range.  CXR, UA, B/L Duplex within normal limits. Tolerating bariatric liquid diet and remained comfortable. The rest of her hospital course was uneventful and she was cleared for discharge. Procedure specific written discharge instructions explained, written materials given to include signs and symptoms of postop complications and VTE prevention. She was instructed to follow a protocol-derived staged meal progression supervised by her dietician. She will follow up with Dr Mason in 7-10 days with subsequent visits at one, three and six months, then annually. She was also instructed to follow up with her dietician and PMD in 30 days On July 18 2018, Mrs. Copeland underwent Laparoscopic Sleeve Gastrectomy for morbid obesity, BMI 45. She received VTE and SSI prophylaxis prior to start of procedure and throughout her hospital course as indicated. Indwelling dodson catheter placed following induction which was removed at end of procedure.. Procedure went as planned, extubated in the OR, indwelling dodson removed then subsequently taken to the recovery room. Postoperatively her pain was managed with opioid and non-opioid analgesia. Once hemodynamically stable she ambulated with assistance and was later transferred to the bariatric unit and placed on bedside continuous pulse oximetry. She was able to void without difficulty once dodson removed same day of surgery.  She began Bariatric sips of clear liquid diet evening of surgery.  	  On POD#1 she had mild  tachycardia but remained asymptomatic w/o sob, dizziness, pt up and ambulating.  She also experienced N/V after drinking liquid UGI obtained and within normal limit, no leak, no obstruction with reflux. Later in the morning, labs revealed low H&H at which time she complained of dizziness.  BP remained wnl. Serial HH monitored over the 24 hours witch showed improved.  Antiemetic and PPI maximized, with subsequent increase in PO intake. She is ambulating independently around the unit.  She became febrile at night 100.4.    On POD#2 the patient remained febrile at 100.3. Other v/s within normal range.  CXR, UA, B/L Duplex within normal limits. On POD #3 she continued tolerating bariatric liquid diet and remained comfortable. The rest of her hospital course was uneventful and she was cleared for discharge. Procedure specific written discharge instructions explained, written materials given to include signs and symptoms of postop complications and VTE prevention. She was instructed to follow a protocol-derived staged meal progression supervised by her dietician. She will follow up with Dr Mason in 7-10 days with subsequent visits at one, three and six months, then annually. She was also instructed to follow up with her dietician and PMD in 30 days

## 2018-07-19 NOTE — PROVIDER CONTACT NOTE (OTHER) - BACKGROUND
s/p lap sleeve gastrectomy
Pt s/p sleeve gastrectomy. Pt spike 100.8 temp overnight.
post-op labs- k3.2 ; pt received 3 runs of potassium ivss

## 2018-07-19 NOTE — PROVIDER CONTACT NOTE (OTHER) - SITUATION
Pt HR low 100s, pt c/o nausea, sever abd pain and is dry-heaving and spitting up as per pt "phlegm."

## 2018-07-19 NOTE — PROVIDER CONTACT NOTE (OTHER) - ACTION/TREATMENT ORDERED:
Md made aware and came to assess pt at bedside. 1gm iv tylenol given. Incentive spirometer encouraged. Pt's temp rechecked w/result of 100.3.
Both MDs stated that they believe most of these symptoms to be attributed to anxiety and that she is spitting up saliva. Hysocaine d/c'd. Labs changed from routine to STAT. Will continue to monitor.
none

## 2018-07-19 NOTE — DISCHARGE NOTE ADULT - MEDICATION SUMMARY - MEDICATIONS TO TAKE
I will START or STAY ON the medications listed below when I get home from the hospital:    oxyCODONE 5 mg/5 mL oral solution  -- 5 milliliter(s) by mouth every 4 hours, As needed, Moderate Pain (4 - 6) MDD:20ml  -- Indication: For pain    Victoza 18 mg/3 mL subcutaneous solution  -- subcutaneous once a day  -- Indication: For diabetes    Zofran ODT 4 mg oral tablet, disintegrating  -- 1 tab(s) by mouth 3 times a day, As Needed MDD:3   -- Indication: For nausea if needed    hyoscyamine 0.125 mg sublingual tablet  -- 1 tab(s) under tongue every 6 hours, As Needed MDD:4   -- May cause drowsiness.  Alcohol may intensify this effect.  Use care when operating dangerous machinery.    -- Indication: For gastric spasm if needed    omeprazole 40 mg oral delayed release capsule  -- 1 cap(s) by mouth once a day MDD:1 open and mix in applesauce  -- do not swallow whole,mix content in applesauce  -- Indication: For reflux I will START or STAY ON the medications listed below when I get home from the hospital:    oxyCODONE 5 mg/5 mL oral solution  -- 5 milliliter(s) by mouth every 4 hours, As needed, Moderate Pain (4 - 6) MDD:20ml  -- Indication: For pain    Victoza 18 mg/3 mL subcutaneous solution  -- subcutaneous once a day  -- Indication: For diabetes    Zofran ODT 4 mg oral tablet, disintegrating  -- 1 tab(s) by mouth 3 times a day, As Needed MDD:3   -- Indication: For nausea if needed    hyoscyamine 0.125 mg sublingual tablet  -- 1 tab(s) under tongue every 6 hours, As Needed MDD:4   -- May cause drowsiness.  Alcohol may intensify this effect.  Use care when operating dangerous machinery.    -- Indication: For gastric spasm if needed    sucralfate 1 g/10 mL oral suspension  -- 10 milliliter(s) by mouth 2 times a day  -- Indication: For GI    omeprazole 40 mg oral delayed release capsule  -- 1 cap(s) by mouth once a day MDD:1 open and mix in applesauce  -- do not swallow whole,mix content in applesauce  -- Indication: For reflux I will START or STAY ON the medications listed below when I get home from the hospital:    oxyCODONE 5 mg/5 mL oral solution  -- 5 milliliter(s) by mouth every 4 hours, As needed, Moderate Pain (4 - 6) MDD:20ml  -- Indication: For pain    Victoza 18 mg/3 mL subcutaneous solution  -- subcutaneous once a day  -- Indication: For diabetes    Zofran ODT 4 mg oral tablet, disintegrating  -- 1 tab(s) by mouth 3 times a day, As Needed MDD:3   -- Indication: For nausea if needed    hyoscyamine 0.125 mg sublingual tablet  -- 1 tab(s) under tongue every 6 hours, As Needed MDD:4   -- May cause drowsiness.  Alcohol may intensify this effect.  Use care when operating dangerous machinery.    -- Indication: For gastric spasm if needed    sucralfate 1 g/10 mL oral suspension  -- 10 milliliter(s) by mouth 2 times a day  -- Indication: For reflux    omeprazole 40 mg oral delayed release capsule  -- 1 cap(s) by mouth once a day MDD:1 open and mix in applesauce  -- do not swallow whole,mix content in applesauce  -- Indication: For reflux

## 2018-07-19 NOTE — DISCHARGE NOTE ADULT - PLAN OF CARE
achieve a healthy lifestyle Call office for nausea, vomiting, fever, abdominal pain and drainage from surgical sites. If you develop shortness of breath, difficulty breathing, chest pain, calf pain with swelling visit your closest emergency room

## 2018-07-19 NOTE — DISCHARGE NOTE ADULT - NS AS ACTIVITY OBS
not while taking narcotic pain medication/No Heavy lifting/straining/Do not make important decisions/Do not drive or operate machinery

## 2018-07-19 NOTE — DISCHARGE NOTE ADULT - CARE PROVIDER_API CALL
Crispin Mason), Surgery  [address] [facility]  [address] [place] [id number]  Phone: [phone]  Fax: [phone]

## 2018-07-20 DIAGNOSIS — F43.20 ADJUSTMENT DISORDER, UNSPECIFIED: ICD-10-CM

## 2018-07-20 LAB
APPEARANCE UR: ABNORMAL
BACTERIA # UR AUTO: NEGATIVE — SIGNIFICANT CHANGE UP
BASOPHILS # BLD AUTO: 0 K/UL — SIGNIFICANT CHANGE UP (ref 0–0.2)
BASOPHILS NFR BLD AUTO: 0.3 % — SIGNIFICANT CHANGE UP (ref 0–2)
BILIRUB UR-MCNC: NEGATIVE — SIGNIFICANT CHANGE UP
COLOR SPEC: YELLOW — SIGNIFICANT CHANGE UP
DIFF PNL FLD: ABNORMAL
EOSINOPHIL # BLD AUTO: 0 K/UL — SIGNIFICANT CHANGE UP (ref 0–0.5)
EOSINOPHIL NFR BLD AUTO: 0.1 % — SIGNIFICANT CHANGE UP (ref 0–6)
EPI CELLS # UR: 2 /HPF — SIGNIFICANT CHANGE UP (ref 0–5)
GLUCOSE BLDC GLUCOMTR-MCNC: 124 MG/DL — HIGH (ref 70–99)
GLUCOSE BLDC GLUCOMTR-MCNC: 127 MG/DL — HIGH (ref 70–99)
GLUCOSE BLDC GLUCOMTR-MCNC: 128 MG/DL — HIGH (ref 70–99)
GLUCOSE BLDC GLUCOMTR-MCNC: 133 MG/DL — HIGH (ref 70–99)
GLUCOSE UR QL: NEGATIVE MG/DL — SIGNIFICANT CHANGE UP
HCT VFR BLD CALC: 26 % — LOW (ref 34.5–45)
HGB BLD-MCNC: 8.7 G/DL — LOW (ref 11.5–15.5)
HYALINE CASTS # UR AUTO: 0 /LPF — SIGNIFICANT CHANGE UP (ref 0–7)
KETONES UR-MCNC: ABNORMAL
LEUKOCYTE ESTERASE UR-ACNC: NEGATIVE — SIGNIFICANT CHANGE UP
LYMPHOCYTES # BLD AUTO: 3.8 K/UL — HIGH (ref 1–3.3)
LYMPHOCYTES # BLD AUTO: 30.4 % — SIGNIFICANT CHANGE UP (ref 13–44)
MCHC RBC-ENTMCNC: 28.6 PG — SIGNIFICANT CHANGE UP (ref 27–34)
MCHC RBC-ENTMCNC: 33.6 GM/DL — SIGNIFICANT CHANGE UP (ref 32–36)
MCV RBC AUTO: 85 FL — SIGNIFICANT CHANGE UP (ref 80–100)
MONOCYTES # BLD AUTO: 0.6 K/UL — SIGNIFICANT CHANGE UP (ref 0–0.9)
MONOCYTES NFR BLD AUTO: 4.7 % — SIGNIFICANT CHANGE UP (ref 2–14)
NEUTROPHILS # BLD AUTO: 8.2 K/UL — HIGH (ref 1.8–7.4)
NEUTROPHILS NFR BLD AUTO: 64.5 % — SIGNIFICANT CHANGE UP (ref 43–77)
NITRITE UR-MCNC: NEGATIVE — SIGNIFICANT CHANGE UP
PH UR: 7.5 — SIGNIFICANT CHANGE UP (ref 5–8)
PLATELET # BLD AUTO: 256 K/UL — SIGNIFICANT CHANGE UP (ref 150–400)
PROT UR-MCNC: NEGATIVE MG/DL — SIGNIFICANT CHANGE UP
RBC # BLD: 3.06 M/UL — LOW (ref 3.8–5.2)
RBC # FLD: 12.7 % — SIGNIFICANT CHANGE UP (ref 10.3–14.5)
RBC CASTS # UR COMP ASSIST: 21 /HPF — HIGH (ref 0–4)
SP GR SPEC: 1.01 — SIGNIFICANT CHANGE UP (ref 1.01–1.02)
SURGICAL PATHOLOGY STUDY: SIGNIFICANT CHANGE UP
UROBILINOGEN FLD QL: NEGATIVE MG/DL — SIGNIFICANT CHANGE UP
WBC # BLD: 12.6 K/UL — HIGH (ref 3.8–10.5)
WBC # FLD AUTO: 12.6 K/UL — HIGH (ref 3.8–10.5)
WBC UR QL: 1 /HPF — SIGNIFICANT CHANGE UP (ref 0–5)

## 2018-07-20 PROCEDURE — 93970 EXTREMITY STUDY: CPT | Mod: 26

## 2018-07-20 PROCEDURE — 99222 1ST HOSP IP/OBS MODERATE 55: CPT

## 2018-07-20 PROCEDURE — 71045 X-RAY EXAM CHEST 1 VIEW: CPT | Mod: 26

## 2018-07-20 RX ORDER — SUCRALFATE 1 G
10 TABLET ORAL
Qty: 600 | Refills: 0 | OUTPATIENT
Start: 2018-07-20

## 2018-07-20 RX ADMIN — HEPARIN SODIUM 5000 UNIT(S): 5000 INJECTION INTRAVENOUS; SUBCUTANEOUS at 05:01

## 2018-07-20 RX ADMIN — ONDANSETRON 4 MILLIGRAM(S): 8 TABLET, FILM COATED ORAL at 17:28

## 2018-07-20 RX ADMIN — Medication 10 MILLIGRAM(S): at 04:53

## 2018-07-20 RX ADMIN — ONDANSETRON 4 MILLIGRAM(S): 8 TABLET, FILM COATED ORAL at 23:56

## 2018-07-20 RX ADMIN — ONDANSETRON 4 MILLIGRAM(S): 8 TABLET, FILM COATED ORAL at 05:00

## 2018-07-20 RX ADMIN — Medication 25 MILLIGRAM(S): at 05:00

## 2018-07-20 RX ADMIN — HEPARIN SODIUM 5000 UNIT(S): 5000 INJECTION INTRAVENOUS; SUBCUTANEOUS at 21:22

## 2018-07-20 RX ADMIN — ONDANSETRON 4 MILLIGRAM(S): 8 TABLET, FILM COATED ORAL at 00:20

## 2018-07-20 RX ADMIN — Medication 10 MILLIGRAM(S): at 21:22

## 2018-07-20 RX ADMIN — Medication 1 GRAM(S): at 17:28

## 2018-07-20 RX ADMIN — ONDANSETRON 4 MILLIGRAM(S): 8 TABLET, FILM COATED ORAL at 11:32

## 2018-07-20 RX ADMIN — HEPARIN SODIUM 5000 UNIT(S): 5000 INJECTION INTRAVENOUS; SUBCUTANEOUS at 13:24

## 2018-07-20 RX ADMIN — Medication 10 MILLIGRAM(S): at 16:21

## 2018-07-20 RX ADMIN — Medication 10 MILLIGRAM(S): at 10:46

## 2018-07-20 RX ADMIN — PANTOPRAZOLE SODIUM 40 MILLIGRAM(S): 20 TABLET, DELAYED RELEASE ORAL at 11:32

## 2018-07-20 RX ADMIN — Medication 1 GRAM(S): at 05:00

## 2018-07-20 NOTE — BEHAVIORAL HEALTH ASSESSMENT NOTE - NSBHCHARTREVIEWVS_PSY_A_CORE FT
Vital Signs Last 24 Hrs  T(C): 37.9 (20 Jul 2018 09:43), Max: 38 (19 Jul 2018 19:34)  T(F): 100.3 (20 Jul 2018 09:43), Max: 100.4 (19 Jul 2018 19:34)  HR: 97 (20 Jul 2018 10:27) (74 - 104)  BP: 133/77 (20 Jul 2018 09:43) (133/77 - 150/93)  BP(mean): --  RR: 18 (20 Jul 2018 09:43) (18 - 18)  SpO2: 98% (20 Jul 2018 10:27) (94% - 100%)

## 2018-07-20 NOTE — BEHAVIORAL HEALTH ASSESSMENT NOTE - NSBHSUICPROTECTFACT_PSY_A_CORE
Responsibility to family and others/Positive therapeutic relationships/High frustration tolerance/Identifies reasons for living/Engaged in work or school/Ability to cope with stress/Future oriented

## 2018-07-20 NOTE — PROGRESS NOTE ADULT - SUBJECTIVE AND OBJECTIVE BOX
Post Op Day#: 1    Subjective: Feeling better today, No Nausea or dizziness.     Objective: No acute events overnight. Febrile 38.1 (unknown etiology). UGI completed yesterday for intermittent n/v. No obstruction noted. Pt has no motivation to do anything today, Minimal fluid intake. Will obtain UA, CXR, blood work.                                                  Vital Signs Last 24 Hrs  T(C): 37.9 (20 Jul 2018 09:43), Max: 38 (19 Jul 2018 19:34)  T(F): 100.3 (20 Jul 2018 09:43), Max: 100.4 (19 Jul 2018 19:34)  HR: 97 (20 Jul 2018 10:27) (74 - 104)  BP: 133/77 (20 Jul 2018 09:43) (133/77 - 150/93)  BP(mean): --  RR: 18 (20 Jul 2018 09:43) (18 - 19)  SpO2: 98% (20 Jul 2018 10:27) (94% - 100%)                                               I&O's Summary    19 Jul 2018 07:01  -  20 Jul 2018 07:00  --------------------------------------------------------  IN: 1900 mL / OUT: 1850 mL / NET: 50 mL    20 Jul 2018 07:01  -  20 Jul 2018 12:41  --------------------------------------------------------  IN: 30 mL / OUT: 300 mL / NET: -270 mL                                                                          8.7    10.8  )-----------( 245      ( 19 Jul 2018 15:09 )             25.4                                                 07-19    138  |  98  |  5<L>  ----------------------------<  125<H>  3.1<L>   |  27  |  0.54    Ca    7.9<L>      19 Jul 2018 09:03  Phos  1.6     07-19  Mg     1.9     07-19      aluminum hydroxide/magnesium hydroxide/simethicone Suspension 30 milliLiter(s) Oral every 4 hours PRN  ceFAZolin   IVPB 2000 milliGRAM(s) IV Intermittent once  dextrose 40% Gel 15 Gram(s) Oral once PRN  dextrose 5%. 1000 milliLiter(s) IV Continuous <Continuous>  dextrose 50% Injectable 12.5 Gram(s) IV Push once  dextrose 50% Injectable 25 Gram(s) IV Push once  dextrose 50% Injectable 25 Gram(s) IV Push once  glucagon  Injectable 1 milliGRAM(s) IntraMuscular once PRN  heparin  Injectable 5000 Unit(s) SubCutaneous every 8 hours  insulin lispro (HumaLOG) corrective regimen sliding scale   SubCutaneous every 6 hours  lactated ringers. 1000 milliLiter(s) IV Continuous <Continuous>  metoclopramide Injectable 10 milliGRAM(s) IV Push every 6 hours  ondansetron Injectable 4 milliGRAM(s) IV Push every 6 hours  oxyCODONE    Solution 5 milliGRAM(s) Oral every 4 hours PRN  pantoprazole  Injectable 40 milliGRAM(s) IV Push daily  sucralfate suspension 1 Gram(s) Oral two times a day      Physical Exam:         Lungs:  clear breath sounds b/l       Heart:  Regular rate & rhythm       Abdomen:  Soft, non-distended.  Scopes sites clean, dry and intact. + bs, - flatus, no rebound or guarding       Skin:  intact, pannus w/o rash       Extremities: + pulses, no edema, no calf tenderness, negative derrek's     Assessment and Plan: s/p Lap Sleeve with low grade fever     - CBC , Basic Metabolic   - CXR, UA   - Bariatric Clear diet today   - DVT/GI prophylaxis, Incentive spirometry  - Ambulate Q 2 hours or as indicated  -  Procedure specific education including diet, vitamins and VTE prevention. Written materials given  -     Senia Call, CARLOS, ANP  [phone]

## 2018-07-20 NOTE — BEHAVIORAL HEALTH ASSESSMENT NOTE - RISK ASSESSMENT
Patient has  risk of harm given risk factors for self harm including recent gastric bypass surgery.  Protective factors, patient currently denies S/H I/I/P,  wants to live for her children, future oriented, hopeful, with supportive family, no substance use, stable housing, no acute psychosis, compliant with treatment.  Pt does not present in imminent risk of harm.

## 2018-07-20 NOTE — BEHAVIORAL HEALTH ASSESSMENT NOTE - NSBHCHARTREVIEWLAB_PSY_A_CORE FT
8.7    10.8  )-----------( 245      ( 19 Jul 2018 15:09 )             25.4     07-19    138  |  98  |  5<L>  ----------------------------<  125<H>  3.1<L>   |  27  |  0.54    Ca    7.9<L>      19 Jul 2018 09:03  Phos  1.6     07-19  Mg     1.9     07-19

## 2018-07-20 NOTE — BEHAVIORAL HEALTH ASSESSMENT NOTE - HPI (INCLUDE ILLNESS QUALITY, SEVERITY, DURATION, TIMING, CONTEXT, MODIFYING FACTORS, ASSOCIATED SIGNS AND SYMPTOMS)
37 year old  female, employed as a nursing assistant, with 2 children, domiciled in a private residence in [place], with no psychiatric history, no suicide attempts, no inpatient psychiatric hospitalizations, with PMH of DM2, HTN, morbid obesity s/p laparoscopic vertical sleeve gastrectomy 7/18/18.  Psychiatry consulted to assess patient for depression.   Patient seen and evaluated, awake and oriented x 3, appears lethargic, reports feeling physically tired and "drained" following her surgery.  Reports inability to sleep as her primary team has recommended for her to stay sitting up right in the chair and drink PO fluids every 15 minutes.  Reports it would be more comfortable for her to lay in bed and rest however is sitting up in the chair.  Denies feeling depressed, states that she is too physically tired which is hindering her from getting up and drinking fluids per the recommendation of her team.  Reports that she felt better physically yesterday and was able to walk, however today reports feeling weak and "lacking strength".  Wonders at times if she should have gotten the surgery, but reports she is motivated because "I am a strong person."  Reports that she believes that she needed this surgery as she had been having issues with her diabetes and effecting her overall life.  Denies having any psychiatric history, reports prior to her surgery feeling stable, denies feeling depressed.  Patient reports looking forward to getting home to her children, and is motivated in partaking in activities such as walking.

## 2018-07-20 NOTE — PROGRESS NOTE ADULT - SUBJECTIVE AND OBJECTIVE BOX
Bariatric Surgery Progress Note    Post Op Day# 2:     24 hr events/Subjective: Patient seen and examined. She had nausea and pain yesterday. She had several episodes of spitting up clear/yellow phelgm. She had an upper GI study yesterday, which revealed mild gastroesophageal reflux. This morning, she is feeling slightly better      Procedure:  Gastrectomy, sleeve, laparoscopic      Vital Signs Last 24 Hrs  Vital Signs Last 24 Hrs  T(C): 36.9 (20 Jul 2018 01:13), Max: 38 (19 Jul 2018 19:34)  T(F): 98.4 (20 Jul 2018 01:13), Max: 100.4 (19 Jul 2018 19:34)  HR: 97 (20 Jul 2018 01:23) (73 - 104)  BP: 148/97 (20 Jul 2018 01:13) (109/76 - 148/97)  BP(mean): --  RR: 18 (20 Jul 2018 01:13) (18 - 19)  SpO2: 100% (20 Jul 2018 01:23) (94% - 100%)    I&O's Summary    18 Jul 2018 07:01  -  19 Jul 2018 07:00  --------------------------------------------------------  IN: 4150 mL / OUT: 2125 mL / NET: 2025 mL    19 Jul 2018 07:01  -  20 Jul 2018 05:13  --------------------------------------------------------  IN: 0 mL / OUT: 1450 mL / NET: -1450 mL      I&O's Detail    18 Jul 2018 07:01  -  19 Jul 2018 07:00  --------------------------------------------------------  IN:    IV PiggyBack: 500 mL    lactated ringers.: 2400 mL    multivitamin/thiamine/folic acid in sodium chloride 0.9%: 1250 mL  Total IN: 4150 mL    OUT:    Voided: 2125 mL  Total OUT: 2125 mL    Total NET: 2025 mL      19 Jul 2018 07:01  -  20 Jul 2018 05:13  --------------------------------------------------------  IN:  Total IN: 0 mL    OUT:    Voided: 1450 mL  Total OUT: 1450 mL    Total NET: -1450 mL        Physical Exam:    General:  Appears stated age, well-groomed, well-nourished, no distress  Chest:  clear breath sounds  Cardiovascular:  Regular rate & rhythm  Abdomen:  Soft, incisions clean, dry intact, tenderness around incisions, no rebound or guarding  Skin:  No rash  Neuro/Psych:  Alert, oriented to time, place and person                                      8.7    10.8  )-----------( 245      ( 19 Jul 2018 15:09 )             25.4   07-19    138  |  98  |  5<L>  ----------------------------<  125<H>  3.1<L>   |  27  |  0.54    Ca    7.9<L>      19 Jul 2018 09:03  Phos  1.6     07-19  Mg     1.9     07-19        aluminum hydroxide/magnesium hydroxide/simethicone Suspension milliLiter(s) Oral every 4 hours PRN  ceFAZolin   IVPB milliGRAM(s) IV Intermittent once  dextrose 40% Gel Gram(s) Oral once PRN  dextrose 5%. milliLiter(s) IV Continuous <Continuous>  dextrose 50% Injectable Gram(s) IV Push once  dextrose 50% Injectable Gram(s) IV Push once  dextrose 50% Injectable Gram(s) IV Push once  glucagon  Injectable milliGRAM(s) IntraMuscular once PRN  heparin  Injectable Unit(s) SubCutaneous every 8 hours  insulin lispro (HumaLOG) corrective regimen sliding scale  SubCutaneous every 6 hours  ketorolac   Injectable milliGRAM(s) IV Push every 6 hours  lactated ringers. milliLiter(s) IV Continuous <Continuous>  ondansetron Injectable milliGRAM(s) IV Push every 6 hours  pantoprazole  Injectable milliGRAM(s) IV Push daily          Assessment and Plan:  37y y/o Female s/p Gastrectomy, sleeve, laparoscopic  , POD # 2    - Start PO liquid oxycodone PRN for pain  - Continue ambulation   - Encourage Incentive Spirometer use  - NPO with meds   - Continue chemical and mechanical DVT prophylaxis  - Discharge home once tolerating adequate PO and 8 point discharge criteria met    Discuss with attending

## 2018-07-20 NOTE — BEHAVIORAL HEALTH ASSESSMENT NOTE - CASE SUMMARY
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  Psychiatry consulted to assess patient for depression.   Patient seen and evaluated, reports feeling physically tired and "drained" following her surgery.  Denies feeling depressed or having a lack of motivation, states that she is physically tired and is "lacking strength" following her surgery.  She reports that she is motivated to get stronger, however today has been feeling more weak and tired.  States she would feel more comfortable sleeping in the bed, however her primary team has recommended she stay up in the chair.  Denies S/H/I/I/P, reports looking forward to returning home to her children.  Recommend: melatonin 3mg po hs; PRN Trazodone 50mg po prn qhs for insomnia.    I have seen and evaluated this patient myself. Chart, labs, meds reviewed. I agree with NP's assessment and plan.

## 2018-07-20 NOTE — BEHAVIORAL HEALTH ASSESSMENT NOTE - SUMMARY
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  Psychiatry consulted to assess patient for depression.   Patient seen and evaluated, reports feeling physically tired and "drained" following her surgery.  Denies feeling depressed or having a lack of motivation, states that she is physically tired and is "lacking strength" following her surgery.  She reports that she is motivated to get stronger, however today has been feeling more weak and tired.  States she would feel more comfortable sleeping in the bed, however her primary team has recommended she stay up in the chair.  Denies S/H/I/I/P, reports looking forward to returning home to her children.  Patient does not criteria for inpatient hospitalization. 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  Psychiatry consulted to assess patient for depression.   Patient seen and evaluated, reports feeling physically tired and "drained" following her surgery.  Denies feeling depressed or having a lack of motivation, states that she is physically tired and is "lacking strength" following her surgery.  She reports that she is motivated to get stronger, however today has been feeling more weak and tired.  States she would feel more comfortable sleeping in the bed, however her primary team has recommended she stay up in the chair.  Denies S/H/I/I/P, reports looking forward to returning home to her children.  Recommend: melatonin 3mg po hs; PRN Trazodone 50mg po prn qhs for insomnia.  Patient does not criteria for inpatient hospitalization.

## 2018-07-21 VITALS
TEMPERATURE: 98 F | SYSTOLIC BLOOD PRESSURE: 156 MMHG | DIASTOLIC BLOOD PRESSURE: 88 MMHG | HEART RATE: 96 BPM | RESPIRATION RATE: 18 BRPM | OXYGEN SATURATION: 97 %

## 2018-07-21 LAB
GLUCOSE BLDC GLUCOMTR-MCNC: 109 MG/DL — HIGH (ref 70–99)
GLUCOSE BLDC GLUCOMTR-MCNC: 115 MG/DL — HIGH (ref 70–99)
GLUCOSE BLDC GLUCOMTR-MCNC: 115 MG/DL — HIGH (ref 70–99)
GLUCOSE BLDC GLUCOMTR-MCNC: 121 MG/DL — HIGH (ref 70–99)
GLUCOSE BLDC GLUCOMTR-MCNC: 141 MG/DL — HIGH (ref 70–99)

## 2018-07-21 RX ORDER — LANOLIN ALCOHOL/MO/W.PET/CERES
3 CREAM (GRAM) TOPICAL AT BEDTIME
Qty: 0 | Refills: 0 | Status: DISCONTINUED | OUTPATIENT
Start: 2018-07-21 | End: 2018-07-21

## 2018-07-21 RX ORDER — TRAZODONE HCL 50 MG
50 TABLET ORAL AT BEDTIME
Qty: 0 | Refills: 0 | Status: DISCONTINUED | OUTPATIENT
Start: 2018-07-21 | End: 2018-07-21

## 2018-07-21 RX ADMIN — PANTOPRAZOLE SODIUM 40 MILLIGRAM(S): 20 TABLET, DELAYED RELEASE ORAL at 12:10

## 2018-07-21 RX ADMIN — HEPARIN SODIUM 5000 UNIT(S): 5000 INJECTION INTRAVENOUS; SUBCUTANEOUS at 05:21

## 2018-07-21 RX ADMIN — ONDANSETRON 4 MILLIGRAM(S): 8 TABLET, FILM COATED ORAL at 05:21

## 2018-07-21 RX ADMIN — Medication 10 MILLIGRAM(S): at 03:52

## 2018-07-21 RX ADMIN — ONDANSETRON 4 MILLIGRAM(S): 8 TABLET, FILM COATED ORAL at 12:10

## 2018-07-21 RX ADMIN — Medication 1 GRAM(S): at 05:22

## 2018-07-21 RX ADMIN — Medication 10 MILLIGRAM(S): at 10:20

## 2018-07-21 NOTE — PROGRESS NOTE ADULT - SUBJECTIVE AND OBJECTIVE BOX
Bariatric Surgery Progress Note    Post Op Day# 3:     24 hr events/Subjective: Patient seen and examined. She had nausea and pain again yesterday. She had several episodes of spitting up clear/yellow phelgm. Additionally, she has been feeling down. She was seen by behavioral health yesterday, who recommended melatonin 3mg po hs and PRN Trazodone 50mg po prn qhs for insomnia, after reaching conclusion of adjustments disorder vs unspecified mood disorder.     Procedure:  Gastrectomy, sleeve, laparoscopic    Vital Signs Last 24 Hrs  T(C): 37.6 (20 Jul 2018 23:57), Max: 37.9 (20 Jul 2018 09:43)  T(F): 99.6 (20 Jul 2018 23:57), Max: 100.3 (20 Jul 2018 09:43)  HR: 95 (21 Jul 2018 00:05) (87 - 103)  BP: 129/78 (20 Jul 2018 23:57) (129/78 - 171/91)  BP(mean): --  RR: 18 (21 Jul 2018 00:05) (18 - 20)  SpO2: 97% (21 Jul 2018 00:05) (95% - 99%)    I&O's Summary    19 Jul 2018 07:01  -  20 Jul 2018 07:00  --------------------------------------------------------  IN: 1900 mL / OUT: 1850 mL / NET: 50 mL    20 Jul 2018 07:01  -  21 Jul 2018 02:09  --------------------------------------------------------  IN: 1890 mL / OUT: 1700 mL / NET: 190 mL    I&O's Detail    19 Jul 2018 07:01  -  20 Jul 2018 07:00  --------------------------------------------------------  IN:    IV PiggyBack: 100 mL    lactated ringers.: 1800 mL  Total IN: 1900 mL    OUT:    Voided: 1850 mL  Total OUT: 1850 mL    Total NET: 50 mL      20 Jul 2018 07:01  -  21 Jul 2018 02:09  --------------------------------------------------------  IN:    lactated ringers.: 1800 mL    Oral Fluid: 90 mL  Total IN: 1890 mL    OUT:    Voided: 1700 mL  Total OUT: 1700 mL    Total NET: 190 mL        Physical Exam:    General:  Appears stated age, well-groomed, well-nourished, no distress  Chest:  clear breath sounds  Cardiovascular:  Regular rate & rhythm  Abdomen:  Soft, incisions clean, dry intact, tenderness around incisions, no rebound or guarding  Skin:  No rash  Neuro/Psych:  Alert, oriented to time, place and person                                      8.7    12.6  )-----------( 256      ( 20 Jul 2018 14:31 )             26.0   07-19    138  |  98  |  5<L>  ----------------------------<  125<H>  3.1<L>   |  27  |  0.54    Ca    7.9<L>      19 Jul 2018 09:03  Phos  1.6     07-19  Mg     1.9     07-19        aluminum hydroxide/magnesium hydroxide/simethicone Suspension milliLiter(s) Oral every 4 hours PRN  ceFAZolin   IVPB milliGRAM(s) IV Intermittent once  dextrose 40% Gel Gram(s) Oral once PRN  dextrose 5%. milliLiter(s) IV Continuous <Continuous>  dextrose 50% Injectable Gram(s) IV Push once  dextrose 50% Injectable Gram(s) IV Push once  dextrose 50% Injectable Gram(s) IV Push once  glucagon  Injectable milliGRAM(s) IntraMuscular once PRN  heparin  Injectable Unit(s) SubCutaneous every 8 hours  insulin lispro (HumaLOG) corrective regimen sliding scale  SubCutaneous every 6 hours  ketorolac   Injectable milliGRAM(s) IV Push every 6 hours  lactated ringers. milliLiter(s) IV Continuous <Continuous>  ondansetron Injectable milliGRAM(s) IV Push every 6 hours  pantoprazole  Injectable milliGRAM(s) IV Push daily      Assessment and Plan:  37y y/o Female s/p Gastrectomy, sleeve, laparoscopic  , POD # 3    - Start PO liquid oxycodone PRN for pain  - Continue ambulation   - Encourage Incentive Spirometer use  - NPO with meds   - Continue chemical and mechanical DVT prophylaxis  - Discharge home once tolerating adequate PO and 8 point discharge criteria met    Discuss with attending

## 2018-07-21 NOTE — PROGRESS NOTE ADULT - ATTENDING COMMENTS
I saw and examined the patient, and reviewed  the history and data including UGI with the patient and staff  Agree with note which was also reviewed and edited where appropriate.  D/W patient, RN, residents and Fellow    sleeve looks ok on UGI, will resume po as tolerated.  home when adequate intake achieved.
Surgery Attending    Pt seen and examined; agree with above resident assessment and plan    Pt to take PO fluids as tolerated and switch to PO pain meds  Melatonin & Trazodone per Psych for sleep derangement and depression  Discharge when PO intake adequate
I saw and examined the patient, and reviewed  the history and data with the patient and staff  Agree with note which was also reviewed and edited where appropriate.  D/W patient, RN, residents and Fellow    Needs OOB, pulm care.  Home when meets d/c criteria.

## 2018-07-27 DIAGNOSIS — Z71.89 OTHER SPECIFIED COUNSELING: ICD-10-CM

## 2018-07-28 PROBLEM — E11.9 TYPE 2 DIABETES MELLITUS WITHOUT COMPLICATIONS: Chronic | Status: ACTIVE | Noted: 2017-10-23

## 2018-07-28 PROBLEM — I10 ESSENTIAL (PRIMARY) HYPERTENSION: Chronic | Status: ACTIVE | Noted: 2017-10-23

## 2018-07-30 ENCOUNTER — APPOINTMENT (OUTPATIENT)
Dept: SURGERY | Facility: CLINIC | Age: 38
End: 2018-07-30
Payer: MEDICAID

## 2018-07-30 VITALS
WEIGHT: 236.2 LBS | OXYGEN SATURATION: 97 % | BODY MASS INDEX: 43.47 KG/M2 | HEART RATE: 93 BPM | SYSTOLIC BLOOD PRESSURE: 121 MMHG | TEMPERATURE: 98.4 F | RESPIRATION RATE: 16 BRPM | HEIGHT: 62 IN | DIASTOLIC BLOOD PRESSURE: 86 MMHG

## 2018-07-30 PROBLEM — E66.01 MORBID (SEVERE) OBESITY DUE TO EXCESS CALORIES: Chronic | Status: ACTIVE | Noted: 2018-07-10

## 2018-07-30 PROBLEM — G47.33 OBSTRUCTIVE SLEEP APNEA (ADULT) (PEDIATRIC): Chronic | Status: ACTIVE | Noted: 2018-07-10

## 2018-07-30 PROCEDURE — 99024 POSTOP FOLLOW-UP VISIT: CPT

## 2018-09-10 ENCOUNTER — APPOINTMENT (OUTPATIENT)
Dept: SURGERY | Facility: CLINIC | Age: 38
End: 2018-09-10
Payer: MEDICAID

## 2018-09-10 VITALS
HEIGHT: 62 IN | OXYGEN SATURATION: 98 % | DIASTOLIC BLOOD PRESSURE: 114 MMHG | BODY MASS INDEX: 41.04 KG/M2 | SYSTOLIC BLOOD PRESSURE: 158 MMHG | TEMPERATURE: 98.4 F | RESPIRATION RATE: 16 BRPM | WEIGHT: 223 LBS | HEART RATE: 76 BPM

## 2018-09-10 PROCEDURE — 99024 POSTOP FOLLOW-UP VISIT: CPT

## 2018-09-10 RX ORDER — OMEPRAZOLE 40 MG/1
40 CAPSULE, DELAYED RELEASE ORAL
Refills: 0 | Status: DISCONTINUED | COMMUNITY
End: 2018-09-10

## 2018-10-11 ENCOUNTER — APPOINTMENT (OUTPATIENT)
Dept: SURGERY | Facility: CLINIC | Age: 38
End: 2018-10-11
Payer: MEDICAID

## 2018-10-11 PROCEDURE — 99024 POSTOP FOLLOW-UP VISIT: CPT

## 2018-10-12 NOTE — DISCHARGE NOTE ADULT - ADDITIONAL INSTRUCTIONS
As explained and outlined on the gastric sleeve discharge instructions. Call office [phone] for immediate medical/surgical concerns
No

## 2018-11-29 ENCOUNTER — APPOINTMENT (OUTPATIENT)
Dept: CHRONIC DISEASE MANAGEMENT | Facility: CLINIC | Age: 38
End: 2018-11-29

## 2018-12-26 PROCEDURE — 88305 TISSUE EXAM BY PATHOLOGIST: CPT

## 2018-12-26 PROCEDURE — 80048 BASIC METABOLIC PNL TOTAL CA: CPT

## 2018-12-26 PROCEDURE — 81025 URINE PREGNANCY TEST: CPT

## 2018-12-26 PROCEDURE — 86901 BLOOD TYPING SEROLOGIC RH(D): CPT

## 2018-12-26 PROCEDURE — 94660 CPAP INITIATION&MGMT: CPT

## 2018-12-26 PROCEDURE — 71045 X-RAY EXAM CHEST 1 VIEW: CPT

## 2018-12-26 PROCEDURE — 84100 ASSAY OF PHOSPHORUS: CPT

## 2018-12-26 PROCEDURE — 93970 EXTREMITY STUDY: CPT

## 2018-12-26 PROCEDURE — 86900 BLOOD TYPING SEROLOGIC ABO: CPT

## 2018-12-26 PROCEDURE — 82962 GLUCOSE BLOOD TEST: CPT

## 2018-12-26 PROCEDURE — 81001 URINALYSIS AUTO W/SCOPE: CPT

## 2018-12-26 PROCEDURE — 85027 COMPLETE CBC AUTOMATED: CPT

## 2018-12-26 PROCEDURE — C1889: CPT

## 2018-12-26 PROCEDURE — 83735 ASSAY OF MAGNESIUM: CPT

## 2018-12-26 PROCEDURE — 74241: CPT

## 2019-01-11 ENCOUNTER — APPOINTMENT (OUTPATIENT)
Dept: SURGERY | Facility: CLINIC | Age: 39
End: 2019-01-11
Payer: MEDICAID

## 2019-01-11 DIAGNOSIS — E55.9 VITAMIN D DEFICIENCY, UNSPECIFIED: ICD-10-CM

## 2019-01-11 DIAGNOSIS — Z87.19 PERSONAL HISTORY OF OTHER DISEASES OF THE DIGESTIVE SYSTEM: ICD-10-CM

## 2019-01-11 PROCEDURE — 99214 OFFICE O/P EST MOD 30 MIN: CPT

## 2019-03-13 ENCOUNTER — RESULT REVIEW (OUTPATIENT)
Age: 39
End: 2019-03-13

## 2019-03-13 ENCOUNTER — INPATIENT (INPATIENT)
Facility: HOSPITAL | Age: 39
LOS: 0 days | Discharge: HOME | DRG: 419 | End: 2019-03-13
Attending: SURGERY | Admitting: SURGERY
Payer: MEDICAID

## 2019-03-13 VITALS
DIASTOLIC BLOOD PRESSURE: 79 MMHG | SYSTOLIC BLOOD PRESSURE: 143 MMHG | OXYGEN SATURATION: 99 % | RESPIRATION RATE: 18 BRPM | HEART RATE: 77 BPM

## 2019-03-13 VITALS
WEIGHT: 179.9 LBS | SYSTOLIC BLOOD PRESSURE: 137 MMHG | TEMPERATURE: 98 F | OXYGEN SATURATION: 95 % | DIASTOLIC BLOOD PRESSURE: 65 MMHG | RESPIRATION RATE: 22 BRPM | HEART RATE: 90 BPM

## 2019-03-13 DIAGNOSIS — Z98.891 HISTORY OF UTERINE SCAR FROM PREVIOUS SURGERY: Chronic | ICD-10-CM

## 2019-03-13 DIAGNOSIS — Z98.84 BARIATRIC SURGERY STATUS: Chronic | ICD-10-CM

## 2019-03-13 DIAGNOSIS — R10.9 UNSPECIFIED ABDOMINAL PAIN: ICD-10-CM

## 2019-03-13 LAB
ALBUMIN SERPL ELPH-MCNC: 4.1 G/DL — SIGNIFICANT CHANGE UP (ref 3.3–5)
ALP SERPL-CCNC: 57 U/L — SIGNIFICANT CHANGE UP (ref 40–120)
ALT FLD-CCNC: 95 U/L — HIGH (ref 10–45)
ANION GAP SERPL CALC-SCNC: 13 MMOL/L — SIGNIFICANT CHANGE UP (ref 5–17)
APPEARANCE UR: ABNORMAL
APTT BLD: 30.4 SEC — SIGNIFICANT CHANGE UP (ref 27.5–36.3)
AST SERPL-CCNC: 251 U/L — HIGH (ref 10–40)
BASOPHILS # BLD AUTO: 0.1 K/UL — SIGNIFICANT CHANGE UP (ref 0–0.2)
BASOPHILS NFR BLD AUTO: 0.8 % — SIGNIFICANT CHANGE UP (ref 0–2)
BILIRUB SERPL-MCNC: 0.7 MG/DL — SIGNIFICANT CHANGE UP (ref 0.2–1.2)
BILIRUB UR-MCNC: NEGATIVE — SIGNIFICANT CHANGE UP
BLD GP AB SCN SERPL QL: NEGATIVE — SIGNIFICANT CHANGE UP
BUN SERPL-MCNC: 13 MG/DL — SIGNIFICANT CHANGE UP (ref 7–23)
CALCIUM SERPL-MCNC: 9.7 MG/DL — SIGNIFICANT CHANGE UP (ref 8.4–10.5)
CHLORIDE SERPL-SCNC: 102 MMOL/L — SIGNIFICANT CHANGE UP (ref 96–108)
CO2 SERPL-SCNC: 22 MMOL/L — SIGNIFICANT CHANGE UP (ref 22–31)
COLOR SPEC: YELLOW — SIGNIFICANT CHANGE UP
CREAT SERPL-MCNC: 0.44 MG/DL — LOW (ref 0.5–1.3)
DIFF PNL FLD: NEGATIVE — SIGNIFICANT CHANGE UP
EOSINOPHIL # BLD AUTO: 0.2 K/UL — SIGNIFICANT CHANGE UP (ref 0–0.5)
EOSINOPHIL NFR BLD AUTO: 1.7 % — SIGNIFICANT CHANGE UP (ref 0–6)
GAS PNL BLDV: SIGNIFICANT CHANGE UP
GLUCOSE SERPL-MCNC: 93 MG/DL — SIGNIFICANT CHANGE UP (ref 70–99)
GLUCOSE UR QL: NEGATIVE — SIGNIFICANT CHANGE UP
HCG UR QL: NEGATIVE — SIGNIFICANT CHANGE UP
HCT VFR BLD CALC: 38.3 % — SIGNIFICANT CHANGE UP (ref 34.5–45)
HGB BLD-MCNC: 13.1 G/DL — SIGNIFICANT CHANGE UP (ref 11.5–15.5)
INR BLD: 1.08 RATIO — SIGNIFICANT CHANGE UP (ref 0.88–1.16)
KETONES UR-MCNC: NEGATIVE — SIGNIFICANT CHANGE UP
LEUKOCYTE ESTERASE UR-ACNC: NEGATIVE — SIGNIFICANT CHANGE UP
LIDOCAIN IGE QN: 38 U/L — SIGNIFICANT CHANGE UP (ref 7–60)
LYMPHOCYTES # BLD AUTO: 3.2 K/UL — SIGNIFICANT CHANGE UP (ref 1–3.3)
LYMPHOCYTES # BLD AUTO: 32.1 % — SIGNIFICANT CHANGE UP (ref 13–44)
MCHC RBC-ENTMCNC: 29.3 PG — SIGNIFICANT CHANGE UP (ref 27–34)
MCHC RBC-ENTMCNC: 34.2 GM/DL — SIGNIFICANT CHANGE UP (ref 32–36)
MCV RBC AUTO: 85.7 FL — SIGNIFICANT CHANGE UP (ref 80–100)
MONOCYTES # BLD AUTO: 0.5 K/UL — SIGNIFICANT CHANGE UP (ref 0–0.9)
MONOCYTES NFR BLD AUTO: 4.5 % — SIGNIFICANT CHANGE UP (ref 2–14)
NEUTROPHILS # BLD AUTO: 6.2 K/UL — SIGNIFICANT CHANGE UP (ref 1.8–7.4)
NEUTROPHILS NFR BLD AUTO: 60.8 % — SIGNIFICANT CHANGE UP (ref 43–77)
NITRITE UR-MCNC: NEGATIVE — SIGNIFICANT CHANGE UP
PH UR: 7.5 — SIGNIFICANT CHANGE UP (ref 5–8)
PLATELET # BLD AUTO: 235 K/UL — SIGNIFICANT CHANGE UP (ref 150–400)
POTASSIUM SERPL-MCNC: 4.6 MMOL/L — SIGNIFICANT CHANGE UP (ref 3.5–5.3)
POTASSIUM SERPL-SCNC: 4.6 MMOL/L — SIGNIFICANT CHANGE UP (ref 3.5–5.3)
PROT SERPL-MCNC: 7.7 G/DL — SIGNIFICANT CHANGE UP (ref 6–8.3)
PROT UR-MCNC: NEGATIVE — SIGNIFICANT CHANGE UP
PROTHROM AB SERPL-ACNC: 12.4 SEC — SIGNIFICANT CHANGE UP (ref 10–12.9)
RBC # BLD: 4.47 M/UL — SIGNIFICANT CHANGE UP (ref 3.8–5.2)
RBC # FLD: 12.7 % — SIGNIFICANT CHANGE UP (ref 10.3–14.5)
RH IG SCN BLD-IMP: POSITIVE — SIGNIFICANT CHANGE UP
RH IG SCN BLD-IMP: POSITIVE — SIGNIFICANT CHANGE UP
SODIUM SERPL-SCNC: 137 MMOL/L — SIGNIFICANT CHANGE UP (ref 135–145)
SP GR SPEC: 1.02 — SIGNIFICANT CHANGE UP (ref 1.01–1.02)
UROBILINOGEN FLD QL: ABNORMAL
WBC # BLD: 10.1 K/UL — SIGNIFICANT CHANGE UP (ref 3.8–10.5)
WBC # FLD AUTO: 10.1 K/UL — SIGNIFICANT CHANGE UP (ref 3.8–10.5)

## 2019-03-13 PROCEDURE — 76705 ECHO EXAM OF ABDOMEN: CPT | Mod: 26,RT

## 2019-03-13 PROCEDURE — 88304 TISSUE EXAM BY PATHOLOGIST: CPT | Mod: 26

## 2019-03-13 PROCEDURE — 74177 CT ABD & PELVIS W/CONTRAST: CPT | Mod: 26

## 2019-03-13 RX ORDER — SODIUM CHLORIDE 9 MG/ML
1000 INJECTION, SOLUTION INTRAVENOUS ONCE
Qty: 0 | Refills: 0 | Status: COMPLETED | OUTPATIENT
Start: 2019-03-13 | End: 2019-03-13

## 2019-03-13 RX ORDER — SODIUM CHLORIDE 9 MG/ML
1000 INJECTION, SOLUTION INTRAVENOUS
Qty: 0 | Refills: 0 | Status: DISCONTINUED | OUTPATIENT
Start: 2019-03-13 | End: 2019-03-13

## 2019-03-13 RX ORDER — CEFOTETAN DISODIUM 1 G
2 VIAL (EA) INJECTION EVERY 12 HOURS
Qty: 0 | Refills: 0 | Status: DISCONTINUED | OUTPATIENT
Start: 2019-03-13 | End: 2019-03-13

## 2019-03-13 RX ORDER — PIPERACILLIN AND TAZOBACTAM 4; .5 G/20ML; G/20ML
3.38 INJECTION, POWDER, LYOPHILIZED, FOR SOLUTION INTRAVENOUS ONCE
Qty: 0 | Refills: 0 | Status: COMPLETED | OUTPATIENT
Start: 2019-03-13 | End: 2019-03-13

## 2019-03-13 RX ORDER — SODIUM CHLORIDE 9 MG/ML
3 INJECTION INTRAMUSCULAR; INTRAVENOUS; SUBCUTANEOUS ONCE
Qty: 0 | Refills: 0 | Status: COMPLETED | OUTPATIENT
Start: 2019-03-13 | End: 2019-03-13

## 2019-03-13 RX ORDER — CEFOTETAN DISODIUM 1 G
1 VIAL (EA) INJECTION ONCE
Qty: 0 | Refills: 0 | Status: DISCONTINUED | OUTPATIENT
Start: 2019-03-13 | End: 2019-03-13

## 2019-03-13 RX ORDER — HYDROMORPHONE HYDROCHLORIDE 2 MG/ML
0.5 INJECTION INTRAMUSCULAR; INTRAVENOUS; SUBCUTANEOUS
Qty: 0 | Refills: 0 | Status: DISCONTINUED | OUTPATIENT
Start: 2019-03-13 | End: 2019-03-13

## 2019-03-13 RX ORDER — ENOXAPARIN SODIUM 100 MG/ML
40 INJECTION SUBCUTANEOUS DAILY
Qty: 0 | Refills: 0 | Status: DISCONTINUED | OUTPATIENT
Start: 2019-03-13 | End: 2019-03-13

## 2019-03-13 RX ORDER — ONDANSETRON 8 MG/1
4 TABLET, FILM COATED ORAL ONCE
Qty: 0 | Refills: 0 | Status: DISCONTINUED | OUTPATIENT
Start: 2019-03-13 | End: 2019-03-13

## 2019-03-13 RX ORDER — PIPERACILLIN AND TAZOBACTAM 4; .5 G/20ML; G/20ML
3.38 INJECTION, POWDER, LYOPHILIZED, FOR SOLUTION INTRAVENOUS ONCE
Qty: 0 | Refills: 0 | Status: DISCONTINUED | OUTPATIENT
Start: 2019-03-13 | End: 2019-03-13

## 2019-03-13 RX ORDER — MORPHINE SULFATE 50 MG/1
6 CAPSULE, EXTENDED RELEASE ORAL ONCE
Qty: 0 | Refills: 0 | Status: DISCONTINUED | OUTPATIENT
Start: 2019-03-13 | End: 2019-03-13

## 2019-03-13 RX ORDER — HYDROMORPHONE HYDROCHLORIDE 2 MG/ML
1 INJECTION INTRAMUSCULAR; INTRAVENOUS; SUBCUTANEOUS
Qty: 0 | Refills: 0 | Status: DISCONTINUED | OUTPATIENT
Start: 2019-03-13 | End: 2019-03-13

## 2019-03-13 RX ORDER — OXYCODONE HYDROCHLORIDE 5 MG/1
1 TABLET ORAL
Qty: 12 | Refills: 0 | OUTPATIENT
Start: 2019-03-13

## 2019-03-13 RX ORDER — OXYCODONE HYDROCHLORIDE 5 MG/1
5 TABLET ORAL EVERY 4 HOURS
Qty: 0 | Refills: 0 | Status: DISCONTINUED | OUTPATIENT
Start: 2019-03-13 | End: 2019-03-13

## 2019-03-13 RX ADMIN — MORPHINE SULFATE 6 MILLIGRAM(S): 50 CAPSULE, EXTENDED RELEASE ORAL at 02:35

## 2019-03-13 RX ADMIN — MORPHINE SULFATE 6 MILLIGRAM(S): 50 CAPSULE, EXTENDED RELEASE ORAL at 07:10

## 2019-03-13 RX ADMIN — Medication 100 GRAM(S): at 10:42

## 2019-03-13 RX ADMIN — PIPERACILLIN AND TAZOBACTAM 200 GRAM(S): 4; .5 INJECTION, POWDER, LYOPHILIZED, FOR SOLUTION INTRAVENOUS at 07:25

## 2019-03-13 RX ADMIN — SODIUM CHLORIDE 1000 MILLILITER(S): 9 INJECTION, SOLUTION INTRAVENOUS at 02:34

## 2019-03-13 RX ADMIN — SODIUM CHLORIDE 3 MILLILITER(S): 9 INJECTION INTRAMUSCULAR; INTRAVENOUS; SUBCUTANEOUS at 02:35

## 2019-03-13 RX ADMIN — SODIUM CHLORIDE 100 MILLILITER(S): 9 INJECTION, SOLUTION INTRAVENOUS at 10:41

## 2019-03-13 NOTE — ED PROVIDER NOTE - OBJECTIVE STATEMENT
38 YOF pmh gastric bypass p/w severe epigastric abdominal pain non-radiating with associated nausea and vomiting nbnb. No previous episodes of similar pain. Pt states she has been unable to pass gas for past 12 hours, last BM 12 hours ago. Pt denies fever, chills, cough, denies headache, denies chest pain, denies back pain. Pt denies hx of SBO.

## 2019-03-13 NOTE — H&P ADULT - HISTORY OF PRESENT ILLNESS
Patient seen and examined, full note to follow. Patient admitted to Dr. Mason, NPO + cefotetan, will consent for OR and add on for 3/13. 38F s/p laparoscopic sleeve gastrectomy in 07/018 presents with severe epigastric pain concerning for cholecystitis. Patient states that she has been recovering well from surgery until last night, when she began to have epigastric pain radiating to her back. The pain was constant and severe, 10/10, associated with ?nausea/vomiting (patient currently states that she did not have any nausea or vomiting after the pain began, previously vomiting in ED). She has never had this pain before. She denies any recent fevers, chills, nausea, vomiting, diarrhea, constipation. She has lost 90 lbs since her procedure in July 2018.     In ED, patient had CT A/P concerning for acute cholecystitis. Ultrasound RUQ demonstrates 38F s/p laparoscopic sleeve gastrectomy in 07/018 presents with severe epigastric pain concerning for cholecystitis. Patient states that she has been recovering well from surgery until last night, when she began to have epigastric pain radiating to her back. The pain was constant and severe, 10/10, associated with ?nausea/vomiting (patient currently states that she did not have any nausea or vomiting after the pain began, previously vomiting in ED). She has never had this pain before. She states that at this time, her pain has completely resolved (1x dose morphine on admission to ED). She denies any recent fevers, chills, nausea, vomiting, diarrhea, constipation. She has lost 90 lbs since her procedure in July 2018.     In ED, patient had CT A/P concerning for acute cholecystitis. Ultrasound RUQ demonstrates gallbladder wall thickening/edema to 6 mm, CBD 3 mm, pericholecystic fluid c/f cholecystitis. Labs notable for WBC 10.1, TBili 0.7, LFT wnl. Surgery is consulted for acute cholecystitis.

## 2019-03-13 NOTE — ASU DISCHARGE PLAN (ADULT/PEDIATRIC) - CARE PROVIDER_API CALL
Crispin Mason)  Surgery  [address], [address]  [place] [address]  Phone: [phone]  Fax: [phone]  Follow Up Time: 2 weeks

## 2019-03-13 NOTE — H&P ADULT - NSHPLABSRESULTS_GEN_ALL_CORE
CBC ( @ :52)                          13.1                     10.1    )--------------(  235        60.8  % Neuts, 32.1  % Lymphs, ANC: 6.2                             38.3      BMP ( @ 02:52)       137     |  102     |  13    			Ca++ --      Ca 9.7          ---------------------------------( 93    		Mg --           4.6     |  22      |  0.44<L>			Ph --        LFTs ( @ 02:52)      TPro 7.7 / Alb 4.1 / TBili 0.7 / DBili -- / <H> / ALT 95<H> / AlkPhos 57    Coags ( @ 02:52)  aPTT 30.4 / INR 1.08 / PT 12.4        VBG ( 02:52)     7.42 / 40 / 35 / 26 / 1.4 / 62<L>%      Lactate: 1.1    Urinalysis ( @ 03:08):     Color: Yellow / Appearance: Slightly Turbid<!> / S.021 / pH: 7.5 / Gluc: Negative / Ketones: Negative / Bili: Negative / Urobili: 3 mg/dL<!> / Protein :Negative / Nitrites: Negative / Leuk.Est: Negative / RBC: 1 / WBC: 3 / Sq Epi:  / Non Sq Epi: 9<H> / Bacteria Few<!>       < from: US Abdomen Upper Quadrant Right (. @ 06:12) >    Liver: Within normal limits.    Bile ducts: Normal caliber. Common bile duct measures 3 mm.     Gallbladder: Mildly distended gallbladder with cholelithiasis.   Gallbladderwall thickening measuring up to 6 mm and pericholecystic   fluid. Negative sonographic Hu's sign, however the patient received   pain medications in the emergency department.       Pancreas: Limited examination.    Right kidney: 10.6 cm. No hydronephrosis.        Ascites: None.    IVC: Visualized portions are within normal limits.    IMPRESSION:     Findings concerning for acute cholecystitis. Cholelithiasis with   gallbladder wall thickening and pericholecystic fluid. Negative   sonographic Hu's sign, however, patient received pain medication   prior to sonogram.                MINERVA ALMAGUER M.D., Radiology Resident  This document has been electronically signed.  GIANNA MENA D.O.; ATTENDING RADIOLOGIST  This document has been electronically signed. Mar 13 2019  6:47AM        < end of copied text >

## 2019-03-13 NOTE — ED ADULT NURSE REASSESSMENT NOTE - NS ED NURSE REASSESS COMMENT FT1
1230 Pt taken to OR via stretcher by transport. A&Ox4. Voiding well. UCG was negative. No c/o pain at present. IV intact without sx of infilt. NPO.

## 2019-03-13 NOTE — ED ADULT NURSE NOTE - OBJECTIVE STATEMENT
37 yo F psh of gastric sleeve with weight loss of 85 lbs came to ED c/o epigastric pain with associated nausea and emesis, nbnb.  Pt states that she had BM 12 hours prior to arrival to ED with no noticeable episodes of flatulence.  Denies CP, back pain, SOB, fevers/chills, diarrhea, lightheadedness, dizziness, changes in urinary or bowel habits.  A&Ox4, abdomen obese, nondistended, tender to palpation in epigastric region.  Skin w/d/i.  VSS.  Pt in fetal position on stretcher.  18G IV established, and pain medication given.  Safety and comfort maintained.  Will continue to monitor.

## 2019-03-13 NOTE — ED PROVIDER NOTE - CONSTITUTIONAL, MLM
normal... ill appearing, awake, alert, oriented to person, place, time/situation and in moderate apparent distress.

## 2019-03-13 NOTE — ED ADULT NURSE REASSESSMENT NOTE - NS ED NURSE REASSESS COMMENT FT1
0705 report received from night nurse. Pt TBA surgery. No bed yet. NPO. states abd pain resolved. A&Ox4. IV intact LACF without sx of infilt

## 2019-03-13 NOTE — ASU DISCHARGE PLAN (ADULT/PEDIATRIC) - CALL YOUR DOCTOR IF YOU HAVE ANY OF THE FOLLOWING:
Nausea and vomiting that does not stop/Inability to tolerate liquids or foods/Wound/Surgical Site with redness, or foul smelling discharge or pus/Fever greater than (need to indicate Fahrenheit or Celsius)

## 2019-03-13 NOTE — ED ADULT NURSE NOTE - CHPI ED NUR SYMPTOMS NEG
no diarrhea/no blood in stool/no burning urination/no dysuria/no chills/no hematuria/no fever/no abdominal distension

## 2019-03-13 NOTE — ED PROVIDER NOTE - ATTENDING CONTRIBUTION TO CARE
****ATTENDING**** 39yo f hx listed presents with abd pain x 1 day. Pain is epigastric non radiating associated with n/v. + BM. No fever, chills, chest pain, palp, sob.   On exam, Patient is awake,alert,oriented x 3. Patient is moderate distress secondary to pain. Patient's chest is clear to ausculation, +s1s2. Abdomen is soft nd/+ tender epigastric and diffuse +BS. Extremity with no swelling or calf tenderness.   Check labs, CT, surgery consult for ddx sbo.

## 2019-03-13 NOTE — H&P ADULT - NSHPPHYSICALEXAM_GEN_ALL_CORE
General: NAD, resting comfortably  Resp: unlabored breathing on RA  CV: HDS, rrr  Abd: soft, nontender, nondistended, well healed surgical laparoscopic incisions, obese  Extr: wwp

## 2019-03-13 NOTE — H&P ADULT - ASSESSMENT
38F s/p laparoscopic sleeve gastrectomy in 07/018 presents with severe epigastric pain x12h concerning for cholecystitis. Ultrasound RUQ demonstrates gallbladder wall thickening/edema to 6 mm, CBD 3 mm, pericholecystic fluid c/f cholecystitis. Surgery is consulted for acute cholecystitis.     - Admit patient to [facility] surgery service   - NPO  - Cefotetan (received 1x dose zosyn in ED)  - LR @ 100  - Dilaudid .5 q3 PRN pain control  - Lovenox for DVT prophylaxis  - Consented and added for laparoscopic cholecystectomy    Discussed with Dr. Arizmendi on behalf of Dr. Silva.    Kesha Leyva PGY-2  Surgery Pager [phone]

## 2019-03-13 NOTE — ED PROVIDER NOTE - CLINICAL SUMMARY MEDICAL DECISION MAKING FREE TEXT BOX
Epigastric abdominal pain, moderate tenderness, nausea and vomiting concern for possible SBO will get labs CT, lipase for pancreatitis. Pain control, surgery consult.

## 2019-04-01 ENCOUNTER — APPOINTMENT (OUTPATIENT)
Dept: SURGERY | Facility: CLINIC | Age: 39
End: 2019-04-01
Payer: MEDICAID

## 2019-04-01 VITALS
WEIGHT: 187 LBS | RESPIRATION RATE: 16 BRPM | OXYGEN SATURATION: 99 % | TEMPERATURE: 98.1 F | BODY MASS INDEX: 33.13 KG/M2 | SYSTOLIC BLOOD PRESSURE: 160 MMHG | DIASTOLIC BLOOD PRESSURE: 110 MMHG | HEART RATE: 66 BPM | HEIGHT: 63 IN

## 2019-04-01 PROCEDURE — 99024 POSTOP FOLLOW-UP VISIT: CPT

## 2019-04-22 NOTE — H&P PST ADULT - NS PRO LACT YNNA
April 22, 2019       Dain Elizabeth DO  [address]  [facility] [address]  VIA In Basket      Patient: Zuri Waddell   YOB: 1935   Date of Visit: 4/22/2019       Dear Dr. Elizabeth:    I saw your patient, Zuri Waddell, for an evaluation. Below are my notes for this visit with her.    If you have questions, please do not hesitate to call me.      Sincerely,        Erasto Love MD        CC: No Recipients  Erasto Love MD  4/22/2019 12:48 PM  Sign at close encounter  Patient was referred for Consultation by Dain Elizabeth DO for Rib Fx; LEFT side      CHIEF COMPLAINT(S): Rib Fx; LEFT side       HISTORY OF PRESENT ILLNESS:  Zuri Waddell is a 83 year old right-handed female presents today for management of Rib Fx; LEFT side - pt states she was walking to the car, carrying a heavy backpack, tripped and fell to the ground.  Pt did not hit the side directly on her side but she had excruciating pain, struggled to catch her breath.  Pt had xrays done in [place], [place] on 4/11/19.  Pt states she is still struggling taking in a deep breath, laying down, sitting up, coughing or sneezing.  No swelling or bruising on the side.  Does not think there has been much improvements.  Pt has been taking 2 extra strength tylenol every 6 hrs but not much relief.  Pt has had 3 other fx ribs over the time- she has osteoporosis so she \"breaks easily\" (fxs were from hugs from her grandchildren)     s/p RIGHT REVERSE Total Shoulder Arthroplasty (DOS: 9/27/17). Doing very well overall... Very pleased. Tylenol Extra Strength still pain. Iced for 1st 2 days... No heat.     Accompanied by no one  Date of Onset: 4/11/19  Severity:9/10     Review of Systems   Constitutional: Negative for chills and fever.   HENT: Negative for congestion, sore throat and tinnitus.    Eyes: Negative for redness.   Respiratory: Negative for cough, shortness of breath, wheezing and stridor.    Cardiovascular: Negative for leg swelling.      Gastrointestinal: Negative for diarrhea, nausea and vomiting.   Endocrine: Negative for cold intolerance and heat intolerance.   Musculoskeletal: Negative for arthralgias, joint swelling and myalgias.   Skin: Negative for color change and pallor.   Neurological: Negative for syncope, weakness and numbness.   Hematological: Does not bruise/bleed easily.   Psychiatric/Behavioral: Negative for agitation, confusion and self-injury.       Past Medial History: Please see intake form    Past Surgical History: Please see intake form    Family History: Please see intake form    Social History: Please see intact form     MEDICATIONS:  Current Outpatient Medications   Medication Sig Dispense Refill   • Multiple Vitamin (MULTI-VITAMIN) tablet      • DENOSumab (PROLIA) 60 MG/ML Solution INJECT SUBCUTANEOUSLY  60 MG / 1 ML EVERY 6 MONTHS     • losartan (COZAAR) 50 MG tablet TAKE 1 TABLET BY MOUTH ONCE DAILY       No current facility-administered medications for this visit.        ALLERGIES:  ALLERGIES:   Allergen Reactions   • No Name Available Other (See Comments)     Unknown  caffene         PHYSICAL EXAMINATION:  LEFT Chest Wall: Positive TTP focal at the LEFT 9th Rib. No crepitus.     IMAGING &TESTING:  Multiple Views LEFT Ribs (4/11/19): 9th Rib Fracture.     ASSESSMENT & PLAN:  Zuri Waddell is a 83 year old female 9th Rib Fracture; LEFT Chest (DOI: 4/11/19)    Plan as follows:  1. Reviewed the outside x-rays  2. Needs more time  3. Resort back to Ice..... 20 minutes at a times.... Once per hour.   4. Tylenol Extra Strength as needed  5. Needs 4 to 6 weeks to improve  6. Follow-up as needed.      Erasto Love MD  04/22/19              no

## 2019-05-23 NOTE — H&P PST ADULT - SKIN
Chief Complaint   Patient presents with     RECHECK     UMP RETURN NEOPLASM       Dayanna Sandoval, RIANAN  
No lesions; no rash

## 2019-08-07 PROBLEM — E55.9 VITAMIN D INSUFFICIENCY: Status: ACTIVE | Noted: 2019-08-07

## 2019-08-07 PROBLEM — Z87.19 HISTORY OF ACUTE CHOLECYSTITIS: Status: RESOLVED | Noted: 2019-04-01 | Resolved: 2019-08-07

## 2019-08-08 ENCOUNTER — APPOINTMENT (OUTPATIENT)
Dept: SURGERY | Facility: CLINIC | Age: 39
End: 2019-08-08
Payer: MEDICAID

## 2019-08-08 VITALS
HEART RATE: 73 BPM | SYSTOLIC BLOOD PRESSURE: 135 MMHG | RESPIRATION RATE: 16 BRPM | OXYGEN SATURATION: 99 % | HEIGHT: 63 IN | TEMPERATURE: 98.7 F | WEIGHT: 179 LBS | DIASTOLIC BLOOD PRESSURE: 94 MMHG | BODY MASS INDEX: 31.71 KG/M2

## 2019-08-08 DIAGNOSIS — Z98.84 BARIATRIC SURGERY STATUS: ICD-10-CM

## 2019-08-08 DIAGNOSIS — E66.01 MORBID (SEVERE) OBESITY DUE TO EXCESS CALORIES: ICD-10-CM

## 2019-08-08 PROCEDURE — 99214 OFFICE O/P EST MOD 30 MIN: CPT

## 2019-08-08 RX ORDER — PNV NO.95/FERROUS FUM/FOLIC AC 28MG-0.8MG
TABLET ORAL
Refills: 0 | Status: ACTIVE | COMMUNITY

## 2019-08-08 RX ORDER — BIOTIN 10 MG
TABLET ORAL
Refills: 0 | Status: ACTIVE | COMMUNITY

## 2019-08-08 RX ORDER — CHROMIUM 200 MCG
TABLET ORAL
Refills: 0 | Status: ACTIVE | COMMUNITY

## 2019-10-31 PROCEDURE — 84295 ASSAY OF SERUM SODIUM: CPT

## 2019-10-31 PROCEDURE — 82803 BLOOD GASES ANY COMBINATION: CPT

## 2019-10-31 PROCEDURE — 96374 THER/PROPH/DIAG INJ IV PUSH: CPT | Mod: XU

## 2019-10-31 PROCEDURE — 94660 CPAP INITIATION&MGMT: CPT

## 2019-10-31 PROCEDURE — 81001 URINALYSIS AUTO W/SCOPE: CPT

## 2019-10-31 PROCEDURE — 83690 ASSAY OF LIPASE: CPT

## 2019-10-31 PROCEDURE — 76705 ECHO EXAM OF ABDOMEN: CPT

## 2019-10-31 PROCEDURE — 81025 URINE PREGNANCY TEST: CPT

## 2019-10-31 PROCEDURE — 83605 ASSAY OF LACTIC ACID: CPT

## 2019-10-31 PROCEDURE — 80053 COMPREHEN METABOLIC PANEL: CPT

## 2019-10-31 PROCEDURE — 82947 ASSAY GLUCOSE BLOOD QUANT: CPT

## 2019-10-31 PROCEDURE — 85014 HEMATOCRIT: CPT

## 2019-10-31 PROCEDURE — 85027 COMPLETE CBC AUTOMATED: CPT

## 2019-10-31 PROCEDURE — 88304 TISSUE EXAM BY PATHOLOGIST: CPT

## 2019-10-31 PROCEDURE — 74177 CT ABD & PELVIS W/CONTRAST: CPT

## 2019-10-31 PROCEDURE — 99285 EMERGENCY DEPT VISIT HI MDM: CPT | Mod: 25

## 2019-10-31 PROCEDURE — 85610 PROTHROMBIN TIME: CPT

## 2019-10-31 PROCEDURE — 82330 ASSAY OF CALCIUM: CPT

## 2019-10-31 PROCEDURE — 86900 BLOOD TYPING SEROLOGIC ABO: CPT

## 2019-10-31 PROCEDURE — 86901 BLOOD TYPING SEROLOGIC RH(D): CPT

## 2019-10-31 PROCEDURE — 85730 THROMBOPLASTIN TIME PARTIAL: CPT

## 2019-10-31 PROCEDURE — 82435 ASSAY OF BLOOD CHLORIDE: CPT

## 2019-10-31 PROCEDURE — 84132 ASSAY OF SERUM POTASSIUM: CPT

## 2019-10-31 PROCEDURE — 86850 RBC ANTIBODY SCREEN: CPT

## 2020-01-01 PROCEDURE — G9005: CPT

## 2020-02-01 ENCOUNTER — OUTPATIENT (OUTPATIENT)
Dept: OUTPATIENT SERVICES | Facility: HOSPITAL | Age: 40
LOS: 1 days | End: 2020-02-01
Payer: MEDICAID

## 2020-02-01 DIAGNOSIS — Z98.84 BARIATRIC SURGERY STATUS: Chronic | ICD-10-CM

## 2020-02-01 DIAGNOSIS — Z98.891 HISTORY OF UTERINE SCAR FROM PREVIOUS SURGERY: Chronic | ICD-10-CM

## 2020-02-19 DIAGNOSIS — Z71.89 OTHER SPECIFIED COUNSELING: ICD-10-CM

## 2020-02-20 PROBLEM — Z98.84 BARIATRIC SURGERY STATUS: Chronic | Status: ACTIVE | Noted: 2019-03-13

## 2020-05-23 ENCOUNTER — EMERGENCY (EMERGENCY)
Facility: HOSPITAL | Age: 40
LOS: 1 days | Discharge: HOME | End: 2020-05-23
Attending: EMERGENCY MEDICINE
Payer: MEDICAID

## 2020-05-23 VITALS
OXYGEN SATURATION: 98 % | TEMPERATURE: 100 F | RESPIRATION RATE: 18 BRPM | SYSTOLIC BLOOD PRESSURE: 171 MMHG | HEART RATE: 79 BPM | DIASTOLIC BLOOD PRESSURE: 109 MMHG

## 2020-05-23 VITALS
HEART RATE: 74 BPM | SYSTOLIC BLOOD PRESSURE: 142 MMHG | TEMPERATURE: 98 F | DIASTOLIC BLOOD PRESSURE: 87 MMHG | OXYGEN SATURATION: 97 % | RESPIRATION RATE: 16 BRPM

## 2020-05-23 DIAGNOSIS — Z98.891 HISTORY OF UTERINE SCAR FROM PREVIOUS SURGERY: Chronic | ICD-10-CM

## 2020-05-23 DIAGNOSIS — Z98.84 BARIATRIC SURGERY STATUS: Chronic | ICD-10-CM

## 2020-05-23 LAB
ALBUMIN SERPL ELPH-MCNC: 4.8 G/DL — SIGNIFICANT CHANGE UP (ref 3.3–5)
ALP SERPL-CCNC: 42 U/L — SIGNIFICANT CHANGE UP (ref 40–120)
ALT FLD-CCNC: 13 U/L — SIGNIFICANT CHANGE UP (ref 10–45)
ANION GAP SERPL CALC-SCNC: 15 MMOL/L — SIGNIFICANT CHANGE UP (ref 5–17)
AST SERPL-CCNC: 15 U/L — SIGNIFICANT CHANGE UP (ref 10–40)
BASE EXCESS BLDV CALC-SCNC: 2.7 MMOL/L — HIGH (ref -2–2)
BASOPHILS # BLD AUTO: 0.02 K/UL — SIGNIFICANT CHANGE UP (ref 0–0.2)
BASOPHILS NFR BLD AUTO: 0.3 % — SIGNIFICANT CHANGE UP (ref 0–2)
BILIRUB SERPL-MCNC: 0.6 MG/DL — SIGNIFICANT CHANGE UP (ref 0.2–1.2)
BUN SERPL-MCNC: 8 MG/DL — SIGNIFICANT CHANGE UP (ref 7–23)
CA-I SERPL-SCNC: 1.25 MMOL/L — SIGNIFICANT CHANGE UP (ref 1.12–1.3)
CALCIUM SERPL-MCNC: 10.2 MG/DL — SIGNIFICANT CHANGE UP (ref 8.4–10.5)
CHLORIDE BLDV-SCNC: 100 MMOL/L — SIGNIFICANT CHANGE UP (ref 96–108)
CHLORIDE SERPL-SCNC: 97 MMOL/L — SIGNIFICANT CHANGE UP (ref 96–108)
CO2 BLDV-SCNC: 30 MMOL/L — SIGNIFICANT CHANGE UP (ref 22–30)
CO2 SERPL-SCNC: 25 MMOL/L — SIGNIFICANT CHANGE UP (ref 22–31)
CREAT SERPL-MCNC: 0.54 MG/DL — SIGNIFICANT CHANGE UP (ref 0.5–1.3)
EOSINOPHIL # BLD AUTO: 0.13 K/UL — SIGNIFICANT CHANGE UP (ref 0–0.5)
EOSINOPHIL NFR BLD AUTO: 1.8 % — SIGNIFICANT CHANGE UP (ref 0–6)
GAS PNL BLDV: 139 MMOL/L — SIGNIFICANT CHANGE UP (ref 135–145)
GAS PNL BLDV: SIGNIFICANT CHANGE UP
GAS PNL BLDV: SIGNIFICANT CHANGE UP
GLUCOSE BLDV-MCNC: 110 MG/DL — HIGH (ref 70–99)
GLUCOSE SERPL-MCNC: 110 MG/DL — HIGH (ref 70–99)
HCO3 BLDV-SCNC: 28 MMOL/L — SIGNIFICANT CHANGE UP (ref 21–29)
HCT VFR BLD CALC: 41.2 % — SIGNIFICANT CHANGE UP (ref 34.5–45)
HCT VFR BLDA CALC: 47 % — SIGNIFICANT CHANGE UP (ref 39–50)
HGB BLD CALC-MCNC: 15.4 G/DL — SIGNIFICANT CHANGE UP (ref 11.5–15.5)
HGB BLD-MCNC: 14 G/DL — SIGNIFICANT CHANGE UP (ref 11.5–15.5)
HOROWITZ INDEX BLDV+IHG-RTO: SIGNIFICANT CHANGE UP
IMM GRANULOCYTES NFR BLD AUTO: 0.1 % — SIGNIFICANT CHANGE UP (ref 0–1.5)
LACTATE BLDV-MCNC: 1.6 MMOL/L — SIGNIFICANT CHANGE UP (ref 0.7–2)
LIDOCAIN IGE QN: 25 U/L — SIGNIFICANT CHANGE UP (ref 7–60)
LYMPHOCYTES # BLD AUTO: 2.48 K/UL — SIGNIFICANT CHANGE UP (ref 1–3.3)
LYMPHOCYTES # BLD AUTO: 35.2 % — SIGNIFICANT CHANGE UP (ref 13–44)
MCHC RBC-ENTMCNC: 29.5 PG — SIGNIFICANT CHANGE UP (ref 27–34)
MCHC RBC-ENTMCNC: 34 GM/DL — SIGNIFICANT CHANGE UP (ref 32–36)
MCV RBC AUTO: 86.9 FL — SIGNIFICANT CHANGE UP (ref 80–100)
MONOCYTES # BLD AUTO: 0.37 K/UL — SIGNIFICANT CHANGE UP (ref 0–0.9)
MONOCYTES NFR BLD AUTO: 5.3 % — SIGNIFICANT CHANGE UP (ref 2–14)
NEUTROPHILS # BLD AUTO: 4.03 K/UL — SIGNIFICANT CHANGE UP (ref 1.8–7.4)
NEUTROPHILS NFR BLD AUTO: 57.3 % — SIGNIFICANT CHANGE UP (ref 43–77)
NRBC # BLD: 0 /100 WBCS — SIGNIFICANT CHANGE UP (ref 0–0)
PCO2 BLDV: 49 MMHG — SIGNIFICANT CHANGE UP (ref 35–50)
PH BLDV: 7.38 — SIGNIFICANT CHANGE UP (ref 7.35–7.45)
PLATELET # BLD AUTO: 253 K/UL — SIGNIFICANT CHANGE UP (ref 150–400)
PO2 BLDV: 38 MMHG — SIGNIFICANT CHANGE UP (ref 25–45)
POTASSIUM BLDV-SCNC: 3.4 MMOL/L — LOW (ref 3.5–5.3)
POTASSIUM SERPL-MCNC: 3.3 MMOL/L — LOW (ref 3.5–5.3)
POTASSIUM SERPL-SCNC: 3.3 MMOL/L — LOW (ref 3.5–5.3)
PROT SERPL-MCNC: 8.4 G/DL — HIGH (ref 6–8.3)
RBC # BLD: 4.74 M/UL — SIGNIFICANT CHANGE UP (ref 3.8–5.2)
RBC # FLD: 13.1 % — SIGNIFICANT CHANGE UP (ref 10.3–14.5)
SAO2 % BLDV: 67 % — SIGNIFICANT CHANGE UP (ref 67–88)
SODIUM SERPL-SCNC: 137 MMOL/L — SIGNIFICANT CHANGE UP (ref 135–145)
WBC # BLD: 7.04 K/UL — SIGNIFICANT CHANGE UP (ref 3.8–10.5)
WBC # FLD AUTO: 7.04 K/UL — SIGNIFICANT CHANGE UP (ref 3.8–10.5)

## 2020-05-23 PROCEDURE — 82947 ASSAY GLUCOSE BLOOD QUANT: CPT

## 2020-05-23 PROCEDURE — 71046 X-RAY EXAM CHEST 2 VIEWS: CPT | Mod: 26

## 2020-05-23 PROCEDURE — 74177 CT ABD & PELVIS W/CONTRAST: CPT | Mod: 26

## 2020-05-23 PROCEDURE — 82330 ASSAY OF CALCIUM: CPT

## 2020-05-23 PROCEDURE — 85014 HEMATOCRIT: CPT

## 2020-05-23 PROCEDURE — 99284 EMERGENCY DEPT VISIT MOD MDM: CPT | Mod: 25

## 2020-05-23 PROCEDURE — 74177 CT ABD & PELVIS W/CONTRAST: CPT

## 2020-05-23 PROCEDURE — 84132 ASSAY OF SERUM POTASSIUM: CPT

## 2020-05-23 PROCEDURE — 83605 ASSAY OF LACTIC ACID: CPT

## 2020-05-23 PROCEDURE — 82435 ASSAY OF BLOOD CHLORIDE: CPT

## 2020-05-23 PROCEDURE — 71046 X-RAY EXAM CHEST 2 VIEWS: CPT

## 2020-05-23 PROCEDURE — 83690 ASSAY OF LIPASE: CPT

## 2020-05-23 PROCEDURE — 96374 THER/PROPH/DIAG INJ IV PUSH: CPT | Mod: XU

## 2020-05-23 PROCEDURE — 84295 ASSAY OF SERUM SODIUM: CPT

## 2020-05-23 PROCEDURE — 82803 BLOOD GASES ANY COMBINATION: CPT

## 2020-05-23 PROCEDURE — 85027 COMPLETE CBC AUTOMATED: CPT

## 2020-05-23 PROCEDURE — 99285 EMERGENCY DEPT VISIT HI MDM: CPT

## 2020-05-23 PROCEDURE — 80053 COMPREHEN METABOLIC PANEL: CPT

## 2020-05-23 RX ORDER — FAMOTIDINE 10 MG/ML
20 INJECTION INTRAVENOUS ONCE
Refills: 0 | Status: COMPLETED | OUTPATIENT
Start: 2020-05-23 | End: 2020-05-23

## 2020-05-23 RX ORDER — POTASSIUM CHLORIDE 20 MEQ
40 PACKET (EA) ORAL ONCE
Refills: 0 | Status: COMPLETED | OUTPATIENT
Start: 2020-05-23 | End: 2020-05-23

## 2020-05-23 RX ORDER — LIDOCAINE 4 G/100G
10 CREAM TOPICAL ONCE
Refills: 0 | Status: COMPLETED | OUTPATIENT
Start: 2020-05-23 | End: 2020-05-23

## 2020-05-23 RX ORDER — SODIUM CHLORIDE 9 MG/ML
1000 INJECTION INTRAMUSCULAR; INTRAVENOUS; SUBCUTANEOUS ONCE
Refills: 0 | Status: COMPLETED | OUTPATIENT
Start: 2020-05-23 | End: 2020-05-23

## 2020-05-23 RX ADMIN — Medication 30 MILLILITER(S): at 08:06

## 2020-05-23 RX ADMIN — FAMOTIDINE 20 MILLIGRAM(S): 10 INJECTION INTRAVENOUS at 08:11

## 2020-05-23 RX ADMIN — SODIUM CHLORIDE 1000 MILLILITER(S): 9 INJECTION INTRAMUSCULAR; INTRAVENOUS; SUBCUTANEOUS at 08:06

## 2020-05-23 RX ADMIN — Medication 40 MILLIEQUIVALENT(S): at 09:30

## 2020-05-23 RX ADMIN — LIDOCAINE 10 MILLILITER(S): 4 CREAM TOPICAL at 08:05

## 2020-05-23 NOTE — ED ADULT NURSE NOTE - OBJECTIVE STATEMENT
39 Y F with PMhx of gastric sleeve and gallbladder removal presents to the ED with intermittent burning epigastric abdominal pain that worsens with eating and drinking. Pt reports vomiting white phlegm. Pt reports night sweats that wake her up but denies coughing up blood. On assessment, A&Ox4. Denies lightheadedness, dizziness, headaches, numbness and tingling. Breathing spontaneously and unlabored.  Sating 97% on Room air. Denies cough, SOB and CP. S1 and S2 heard. No Peripheral edema. Cap refill 2s. No JVD. Peripheral pulses strong and equal bilaterally. Denies CP, SOB and palpitations. Abdomen soft, nontender, nondistended, negative CVA tenderness, positive bowel sounds in all four quadrants. Pt is continent. Denies diarrhea, dysuria, melena and hematuria. IV placed 20g in RAC. Labs drawn and sent. Pt safety maintained. Call bell within reach. Side rails in upward position. Pt awaiting dispo.

## 2020-05-23 NOTE — CONSULT NOTE ADULT - SUBJECTIVE AND OBJECTIVE BOX
Bariatric Surgery Consult  Consulting surgical team: [facility]  Consulting attending: Dr. Magana    HPI:  39F PMH laparoscopic sleeve gastrectomy (2018), laparoscopic cholecystectomy (2019), presenting with abdominal pain. Patient states that she has had 5 days of intermittent epigastric abdominal pain which is burning in nature. Nonradiating. Worse at night. Has had some nausea. Denies fevers/chills.    Upon arrival to [facility] ED, AVSS. Labs wnl. CT abd/pelvis obtained, which demonstrated status post sleeve gastrectomy without evidence of complication. At time of exam by surgery team, pt states pain has resolved.     PAST MEDICAL HISTORY:  Gastric bypass status for obesity  Morbid obesity with BMI of 40.0-44.9, adult  LATOYA on CPAP  HTN (hypertension)  DM2 (diabetes mellitus, type 2)      PAST SURGICAL HISTORY:  S/P laparoscopic sleeve gastrectomy  History of   No significant past surgical history      MEDICATIONS:      ALLERGIES:  No Known Allergies      VITALS & I/Os:  Vital Signs Last 24 Hrs  T(C): 36.9 (23 May 2020 11:15), Max: 37.6 (23 May 2020 07:01)  T(F): 98.4 (23 May 2020 11:15), Max: 99.7 (23 May 2020 07:01)  HR: 74 (23 May 2020 11:15) (74 - 79)  BP: 142/87 (23 May 2020 11:15) (134/107 - 171/109)  BP(mean): --  RR: 16 (23 May 2020 11:15) (16 - 18)  SpO2: 97% (23 May 2020 11:15) (97% - 98%)    I&O's Summary      PHYSICAL EXAM:  General: Laying in bed, in no acute distress  Respiratory: Nonlabored  Abdominal: Soft, nondistended, nontender. No rebound or guarding. No organomegaly, no palpable mass. Well healed laparoscopic incisions.   Extremities: Warm    LABS:                        14.0   7.04  )-----------( 253      ( 23 May 2020 08:23 )             41.2     05-23    137  |  97  |  8   ----------------------------<  110<H>  3.3<L>   |  25  |  0.54    Ca    10.2      23 May 2020 08:23    TPro  8.4<H>  /  Alb  4.8  /  TBili  0.6  /  DBili  x   /  AST  15  /  ALT  13  /  AlkPhos  42      Lactate:   @ 08:23  1.6                  IMAGING:  CT Abdomen and Pelvis w/ IV Cont (20 @ 09:40)  FINDINGS:    LOWER CHEST: Within normal limits.    LIVER: Within normal limits.  BILE DUCTS: Normal caliber.  GALLBLADDER: Cholecystectomy.  SPLEEN: Within normal limits.  PANCREAS: Within normal limits.  ADRENALS: Within normal limits.  KIDNEYS/URETERS: Within normal limits.    BLADDER: Underdistended grossly unremarkable  REPRODUCTIVE ORGANS: Calcified subserosal uterine fibroids. Redemonstration of a 2.4 cm left adnexal cystic structure, unchanged.    BOWEL: No bowel obstruction. Status post sleeve gastrectomy without evidence of complication. Appendix is normal.  PERITONEUM: No ascites.  VESSELS: Within normal limits.  RETROPERITONEUM/LYMPH NODES: No lymphadenopathy.    ABDOMINAL WALL: Small fat containing xavier-umbilical hernia.  BONES: Bilateral sclerosis about the sacroiliac joints, predominantly involving the iliac bones. 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    IMPRESSION:     No bowel obstruction. Status post sleeve gastrectomy without evidence of complication.

## 2020-05-23 NOTE — CONSULT NOTE ADULT - ASSESSMENT
39F PMH laparoscopic sleeve gastrectomy (2018), laparoscopic cholecystectomy (2019), presenting with abdominal pain.    - Imaging reviewed - no acute process seen  - No indication for urgent surgical intervention  - Dispo per ED  - Discussed with attending Dr. Izzy Parker PGY3  [facility]  [id number]

## 2020-05-23 NOTE — ED PROVIDER NOTE - PMH
DM2 (diabetes mellitus, type 2)    Gastric bypass status for obesity    HTN (hypertension)    Morbid obesity with BMI of 40.0-44.9, adult    LATOYA on CPAP

## 2020-05-23 NOTE — ED PROVIDER NOTE - OBJECTIVE STATEMENT
38 yo F with ho gastric sleeve in 2018 and cholecystectomy in 2019, HTN who presents with 5 days of epigastric adnominal pain. Burning in nature and non radiating. Worse at night and after she eats. Severe at its worse. Also endorsing sweating at night. LBM this morning. Passing gas. Denies vomiting but states she keeps having white, burning flegm in her mouth. Denies fevers, cough, sob, urinary symptoms. 40 yo F with ho gastric sleeve in 2018 and cholecystectomy in 2019, HTN who presents with 5 days of epigastric adnominal pain. Burning in nature and non radiating. Worse at night and after she eats. Severe at its worse. Also endorsing sweating at night. LBM this morning. Passing gas. Denies vomiting but states she keeps having white, burning flegm in her mouth. Denies fevers, cough, sob, urinary symptoms.    Attendinyo female presents with epigastric pain for about 1 week.  symptoms worse with eating.  no fever.  history of gastric sleeve.

## 2020-05-23 NOTE — ED PROVIDER NOTE - PATIENT PORTAL LINK FT
You can access the FollowMyHealth Patient Portal offered by [facility] by registering at the following website: [email or web address]. By joining [employer]’s FollowMyHealth portal, you will also be able to view your health information using other applications (apps) compatible with our system.

## 2020-05-23 NOTE — ED PROVIDER NOTE - CARE PROVIDER_API CALL
Crispin Mason  SURGERY  [address] [place] [id number]  Phone: [phone]  Fax: [phone]  Follow Up Time:

## 2020-05-23 NOTE — ED PROVIDER NOTE - PROGRESS NOTE DETAILS
Joseph Frankel PGY1: Patient feeling better after GI cocktail. CT does not show cause of patient's pain. Surgery consulted, saw patient, and signed off. Will dc with bariatric surgery follow up. Discussed plan and return precautions with patient who understands and agrees. All questions answered.

## 2020-05-23 NOTE — ED ADULT NURSE NOTE - NSIMPLEMENTINTERV_GEN_ALL_ED
Implemented All Universal Safety Interventions:  [place] to call system. Call bell, personal items and telephone within reach. Instruct patient to call for assistance. Room bathroom lighting operational. Non-slip footwear when patient is off stretcher. Physically safe environment: no spills, clutter or unnecessary equipment. Stretcher in lowest position, wheels locked, appropriate side rails in place.

## 2020-05-23 NOTE — ED PROVIDER NOTE - PHYSICAL EXAMINATION
Gen: Alert and oriented. Lying comfortably in bed. Answering questions appropriately  HEENT: extra occular movements intact, no nasal discharge, mucous membranes moist  CV: Regular rate and rhythm, +S1/S2, no murmurs/rubs/gallops,   Resp: Clear to ausculation bilaterally, no wheezes/rhonchi/rales  GI: Abdomen soft non-distended, epigastric tenderness, no rebound or guarding, no masses  MSK: No open wounds, no bruising, no LE edema, Homans sign negative bl  Neuro: A&Ox4, following commands, moving all four extremities spontaneously  Psych: appropriate mood

## 2020-05-23 NOTE — ED ADULT NURSE REASSESSMENT NOTE - NS ED NURSE REASSESS COMMENT FT1
Pt remains in the ED. Resting comfortably in bed. A&Ox3. Breathing spontaneously and unlabored. NAD. Pt reports no pain and that she is feeling much better. Pt safety maintained. Will continue to monitor. Awaiting CT.

## 2020-05-23 NOTE — ED PROVIDER NOTE - CLINICAL SUMMARY MEDICAL DECISION MAKING FREE TEXT BOX
Joseph Frankel PGY1: 40 yo F with gastric sleeve and cholecystectomy presenting for epigastric pain. Hypertensive otherwise VSS. Patient looks well and is non toxic appearing. PE as above. Most likely gerd vs pancreatitis. Consider complication of bariatric surgery. Will get basic labs, lipase, vbg, CT AandP, pain control, surgery consult. will reassess.

## 2020-05-23 NOTE — ED PROVIDER NOTE - NSFOLLOWUPINSTRUCTIONS_ED_ALL_ED_FT
Gastritis is soreness and swelling (inflammation) of the lining of the stomach. Gastritis can develop as a sudden onset (acute) or long-term (chronic) condition. If gastritis is not treated, it can lead to stomach bleeding and ulcers. Causes include viral and bacterial infections, excessive alcohol consumption, tobacco use, or certain medications. Symptoms include nausea, vomiting, or abdominal pain or burning especially after eating. Avoid foods or drinks that make your symptoms worse such as caffeine, chocolate, spicy foods, acidic foods, or alcohol.    - Please follow up with Dr. Mason this week    - Continue taking the famotidine and omeprazole as already prescribed    SEEK IMMEDIATE MEDICAL CARE IF YOU HAVE ANY OF THE FOLLOWING SYMPTOMS: black or bloody stools, blood or coffee-ground-colored vomitus, worsening abdominal pain, fever, or inability to keep fluids down.

## 2020-08-01 PROCEDURE — G9005: CPT

## 2021-05-18 NOTE — DISCHARGE NOTE ADULT - MEDICATION SUMMARY - MEDICATIONS TO CHANGE
[Takes medication as prescribed] : takes I will SWITCH the dose or number of times a day I take the medications listed below when I get home from the hospital:  None

## 2023-02-17 NOTE — ED PROVIDER NOTE - NS ED ATTENDING STATEMENT MOD
16
I have personally seen and examined this patient.  I have fully participated in the care of this patient. I have reviewed all pertinent clinical information, including history, physical exam, plan and the Resident’s note and agree except as noted.

## 2023-07-13 NOTE — H&P PST ADULT - PROBLEM SELECTOR PLAN 1
planned for laparoscopic vertical sleeve gastrectomy 7/18/18.    PST labs send  preprocedure surgical scrub instructions discussed
4 = No assist / stand by assistance

## 2023-09-01 NOTE — ED PROVIDER NOTE - EYES, MLM
Fasting blood sugar ideally < 130 (80- 130)  Blood sugar throughout the day 70 - 180
Clear bilaterally, pupils equal, round and reactive to light.

## 2023-10-19 ENCOUNTER — APPOINTMENT (OUTPATIENT)
Dept: VASCULAR SURGERY | Facility: CLINIC | Age: 43
End: 2023-10-19
Payer: MEDICAID

## 2023-10-19 VITALS
TEMPERATURE: 97.8 F | DIASTOLIC BLOOD PRESSURE: 85 MMHG | HEART RATE: 83 BPM | SYSTOLIC BLOOD PRESSURE: 124 MMHG | BODY MASS INDEX: 35.08 KG/M2 | HEIGHT: 63 IN | WEIGHT: 198 LBS

## 2023-10-19 DIAGNOSIS — I83.893 VARICOSE VEINS OF BILATERAL LOWER EXTREMITIES WITH OTHER COMPLICATIONS: ICD-10-CM

## 2023-10-19 PROCEDURE — 99203 OFFICE O/P NEW LOW 30 MIN: CPT

## 2023-10-19 PROCEDURE — 93970 EXTREMITY STUDY: CPT

## 2023-10-19 RX ORDER — SIMVASTATIN 80 MG/1
TABLET, FILM COATED ORAL
Refills: 0 | Status: ACTIVE | COMMUNITY

## 2023-10-19 RX ORDER — HYDROCHLOROTHIAZIDE 12.5 MG/1
TABLET ORAL
Refills: 0 | Status: ACTIVE | COMMUNITY

## 2024-01-25 ENCOUNTER — APPOINTMENT (OUTPATIENT)
Dept: VASCULAR SURGERY | Facility: CLINIC | Age: 44
End: 2024-01-25

## 2024-03-28 NOTE — ED PROVIDER NOTE - EYES NEGATIVE STATEMENT, MLM
Physician Discharge Summary     Patient ID:  Name: Tsering Alvarez  MRN: [id number]  : 1990    Admit date: 3/25/2024    Discharge date: 3/29/2024    Admitting Physician: Ramesh Sim MD    Discharge Physician: Elias Quiñonez MD    Admission Diagnoses: Hypercalcemia [E83.52]  Elevated serum creatinine [R79.89]  ALEXANDRE (acute kidney injury) (CMD) [N17.9]    Discharge Diagnoses:   1. H/O ESRD 2/2 FSGS, S/P DDKT 24.              -- Expected DGF 2/2 HyperK requiring SICU transfer, correction and HD prior to OR.              -- Outpatient labs 3/25 noting ALEXANDRE, SCr 3.39.              - IVFs per [facility]. Sodium chloride . 45 with sodium bicarb per Nephrology. - SCr decreased to 2.9 today. - S/p bx 3/28/24 with Dr. Mauro Love  -- Path to be reviewed by Dr Mauro Love with Radiology  -- Hgb stable   -- US with concern for post-procedure hematoma. Reviewed with Nephrologist, the first was lidocaine and then the second was pre-procedure    - Repeat US and labs next week                                     - Monitor UOP. - Renal US stable without significant abnormality.                           - Infectious work up negative to date. - Patient reports diarrheal illness for days PTA, now no BM since admission.                                       - UC with mixed delroy. -- Resume bASA after US next week. 2. Prophylactic Immunosuppression. -- Thymo induction, total 4.5 mg/kg completed. -- Prograf BID, trough goal 8-10 ng/mL. Discharge on 7mg QAM and 8mg QPM             -- Cellcept 1000 mg BID. -- Prednisone 5 mg QD. 3. Post-Transplant. -- UTI / PCP. Bactrim SS daily renally dosed x 6 months (end date 24). - Monitor for dose adjustment Monday             -- CMV (D-/R+). Valcyte renally dosed x 3 months (end date 24).     - Monitor for dose adjustment Monday     4. Hypercalcemia. -- Ca 12.8 and iCa 1.69 on admission. Ca now WNL              -- PTH 20.              -- Stop Scheduled Tums. Stop Calcitriol 1 mcg BID. -- Continue hydration per Neph, no Sensipar at this time              -- Management per [facility]. 5. Heart Burn. -- Patient was on scheduled TUMS but stopped due to hypercalcemia              -- Start Protonix. -- Per Neph patient can have TUMS Daily PRN if heart burn persists. Admission Condition: fair    Discharged Condition: good    Hospital Course:   Reta Nunez is a 28year old female with a PMHx significant for ESRD 2/2 FSGS, now S/P DDKT (2/11/24). Post-op course complicated by anemia requiring PRBCs, hyperkalemia, and volume overload. Patient was noted to have ALEXANDRE and hypercalcemia on outpatient labs on 3/25/24. Patient sent to ED for evaluation and admission. Patient calcium normalized with IVFs, holding of scheduled TUMS and Calcitriol inpatient. Patient started on Protonix for ongoing heartburn while inpatient. She underwent renal bx 3/28/24 with Dr. Gilbert Dixon for ongoing ALEXANDRE. Path to be reviewed by Dr Gilbert Dixon with Radiology. Hgb stable. US with concern for post-procedure hematoma. Reviewed with Nephrologist, the first was lidocaine and then the second was pre-procedure. Repeat US and labs next week. Plan to resume bASA after US next week. She is stable for discharge today with labs Monday and follow up with Nephrology arranged 4/22/24. Consults: [facility]    Significant Diagnostic Studies: Reviewed    Medications     Summary of your Discharge Medications        Take these Medications        Details   mycophenolate 500 MG tablet  Commonly known as: CellCept  This medication is very important: It prevents organ rejection. Take 2 tablets by mouth in the morning and 2 tablets in the evening.      predniSONE 5 MG tablet  Commonly known as: Luis Stern  This medication is very important: It prevents organ rejection. Take 1 tablet by mouth daily. * TACROlimus 0.5 MG capsule  Commonly known as: Prograf  This medication is very important: It prevents organ rejection. Take 1 tablet by mouth twice daily as directed. * TACROlimus 5 MG capsule  Commonly known as: PROGRAF  This medication is very important: It prevents organ rejection. Take as directed by your MD in combination with 1 mg and 0.5 mg tablets. Current dose is 7 mg in morning and 7 mg at night     * TACROlimus 1 MG capsule  Commonly known as: Prograf  This medication is very important: It prevents organ rejection. Take as directed by your MD in combination with 1 mg and 0.5 mg tablets. Current dose is 7 mg in morning and 8 mg at night     sulfamethoxazole-trimethoprim 400-80 MG per tablet  Commonly known as: BACTRIM SS  This medication is very important: It prevents dangerous infection. Take 1 tablet by mouth 3 days a week. Monday, Wednesday, Friday     Valcyte 450 MG tablet  This medication is very important: It prevents dangerous infection. Generic drug: valGANciclovir  Take 1 tablet by mouth 3 days a week. Tuesday, Thursday, Sat     albuterol 108 (90 Base) MCG/ACT inhaler   Inhale 2 puffs into the lungs every 4 hours as needed for Shortness of Breath or Wheezing. amLODIPine 5 MG tablet  Commonly known as: NORVASC   Take 1 tablet by mouth daily. aspirin 81 MG chewable tablet   Chew 1 tablet by mouth daily. Do not take until after your one week follow-up US.     calcium carbonate 500 MG chewable tablet  Commonly known as: TUMS   Chew 1 tablet by mouth daily as needed for Heartburn. Do not exceed 1 tab in 24 hours. magnesium oxide 400 MG tablet  Commonly known as: MAG-OX   Take 800 mg in morning ( 2 tablets) and 800 mg at night (2 tablet)     pantoprazole 40 MG tablet  Commonly known as: PROTONIX   Take 1 tablet by mouth daily (before breakfast).  Do not start before March 29, 2024. * This list has 3 medication(s) that are the same as other medications prescribed for you. Read the directions carefully, and ask your doctor or other care provider to review them with you. Discharge Exam:  Vitals:    03/29/24 1241   BP: 118/68   Pulse: 74   Resp: 18   Temp: 99.3 Â°F (37.4 Â°C)     Physical Exam  General- Awake/alert in NAD. Calm/cooperative. Well nourished. Skin- Warm and dry to touch. No rashes/lesions. No jaundice. Eyes- No scleral icterus. EOMs grossly intact. CV- S1S2, RRR. Pulmonary- Clear breath sounds to auscultation. On RA. Normal Respiratory Effort. Abdomen- Round, soft, non-tender, and non-distended with palpation. + BS. Well healing surgical incision. Extremities-  No erythema. No edema. Neuro-  No focal deficits. Strength and sensation grossly intact. Speech clear. Psych- Alert and oriented. Mood and affect appropriate. Disposition: Home    Surgical Discharge Instructions  -- No driving or lifting more than 10 pounds (1 gallon of milk) until cleared by the surgeon. You are clear to walk but no heavy exercise. No jumping, running, swimming, or weight lifting.     -- Able to take a shower, but no baths until incision is fully healed. Let warm soapy water run over the incision, do not scrub at the incision. -- Call if change in incision drainage, redness, hardness, warmth, or increased incisional pain. Call if temperature above 100.0Â°, chest pain, cough, abdominal pain, abdominal distension, weight gain of 2 pounds, nausea, vomiting, or flu-like symptoms  Clinic Phone # [phone]    -- Bring medication box, all medication bottles, tracking sheets for vital signs/intake and output, and medication list to clinic appointments.      Nutrition Plan - Regular Diet  Psychosocial Concerns - No concerns  Activity - As tolerated  Financial Concerns - No concerns    Follow Ups  Follow Up with Labs- 4/1/24  Follow Up with Nephrology - 4/22/24    Greater than 30 minutes were spent coordinating the discharge for this patient. Plan of care was discussed with the patient who agreed. All questions and concerns were answered. Alexandra Hernandez PA-C  Ab[facility] Transplant [facility] Hepatology  Pager [phone]  3/29/2024    After 3:30 PM please call the Transplant Hepatology clinic at [phone] and page the RONALD/Provider on call for the service. no visual changes.

## 2024-12-06 ENCOUNTER — INPATIENT (INPATIENT)
Facility: HOSPITAL | Age: 44
LOS: 3 days | Discharge: HOME | DRG: 392 | End: 2024-12-10
Attending: INTERNAL MEDICINE | Admitting: INTERNAL MEDICINE
Payer: MEDICAID

## 2024-12-06 VITALS
WEIGHT: 190.04 LBS | RESPIRATION RATE: 22 BRPM | TEMPERATURE: 98 F | OXYGEN SATURATION: 99 % | HEART RATE: 60 BPM | SYSTOLIC BLOOD PRESSURE: 158 MMHG | HEIGHT: 63 IN | DIASTOLIC BLOOD PRESSURE: 92 MMHG

## 2024-12-06 DIAGNOSIS — Z98.84 BARIATRIC SURGERY STATUS: Chronic | ICD-10-CM

## 2024-12-06 DIAGNOSIS — Z98.891 HISTORY OF UTERINE SCAR FROM PREVIOUS SURGERY: Chronic | ICD-10-CM

## 2024-12-06 LAB
ADD ON TEST-SPECIMEN IN LAB: SIGNIFICANT CHANGE UP
ADD ON TEST-SPECIMEN IN LAB: SIGNIFICANT CHANGE UP
ALBUMIN SERPL ELPH-MCNC: 4.3 G/DL — SIGNIFICANT CHANGE UP (ref 3.3–5)
ALP SERPL-CCNC: 44 U/L — SIGNIFICANT CHANGE UP (ref 40–120)
ALT FLD-CCNC: 10 U/L — SIGNIFICANT CHANGE UP (ref 10–45)
ANION GAP SERPL CALC-SCNC: 17 MMOL/L — SIGNIFICANT CHANGE UP (ref 5–17)
ANION GAP SERPL CALC-SCNC: 17 MMOL/L — SIGNIFICANT CHANGE UP (ref 5–17)
ANION GAP SERPL CALC-SCNC: 18 MMOL/L — HIGH (ref 5–17)
AST SERPL-CCNC: 12 U/L — SIGNIFICANT CHANGE UP (ref 10–40)
BASOPHILS # BLD AUTO: 0.02 K/UL — SIGNIFICANT CHANGE UP (ref 0–0.2)
BASOPHILS NFR BLD AUTO: 0.3 % — SIGNIFICANT CHANGE UP (ref 0–2)
BILIRUB SERPL-MCNC: 0.5 MG/DL — SIGNIFICANT CHANGE UP (ref 0.2–1.2)
BUN SERPL-MCNC: 5 MG/DL — LOW (ref 7–23)
BUN SERPL-MCNC: 6 MG/DL — LOW (ref 7–23)
BUN SERPL-MCNC: <4 MG/DL — LOW (ref 7–23)
CALCIUM SERPL-MCNC: 8.4 MG/DL — SIGNIFICANT CHANGE UP (ref 8.4–10.5)
CALCIUM SERPL-MCNC: 8.5 MG/DL — SIGNIFICANT CHANGE UP (ref 8.4–10.5)
CALCIUM SERPL-MCNC: 9.4 MG/DL — SIGNIFICANT CHANGE UP (ref 8.4–10.5)
CHLORIDE SERPL-SCNC: 95 MMOL/L — LOW (ref 96–108)
CHLORIDE SERPL-SCNC: 97 MMOL/L — SIGNIFICANT CHANGE UP (ref 96–108)
CHLORIDE SERPL-SCNC: 97 MMOL/L — SIGNIFICANT CHANGE UP (ref 96–108)
CO2 SERPL-SCNC: 20 MMOL/L — LOW (ref 22–31)
CO2 SERPL-SCNC: 21 MMOL/L — LOW (ref 22–31)
CO2 SERPL-SCNC: 23 MMOL/L — SIGNIFICANT CHANGE UP (ref 22–31)
CREAT SERPL-MCNC: 0.34 MG/DL — LOW (ref 0.5–1.3)
CREAT SERPL-MCNC: 0.4 MG/DL — LOW (ref 0.5–1.3)
CREAT SERPL-MCNC: 0.49 MG/DL — LOW (ref 0.5–1.3)
EGFR: 118 ML/MIN/1.73M2 — SIGNIFICANT CHANGE UP
EGFR: 124 ML/MIN/1.73M2 — SIGNIFICANT CHANGE UP
EGFR: 129 ML/MIN/1.73M2 — SIGNIFICANT CHANGE UP
EOSINOPHIL # BLD AUTO: 0.05 K/UL — SIGNIFICANT CHANGE UP (ref 0–0.5)
EOSINOPHIL NFR BLD AUTO: 0.7 % — SIGNIFICANT CHANGE UP (ref 0–6)
GAS PNL BLDV: SIGNIFICANT CHANGE UP
GLUCOSE SERPL-MCNC: 101 MG/DL — HIGH (ref 70–99)
GLUCOSE SERPL-MCNC: 120 MG/DL — HIGH (ref 70–99)
GLUCOSE SERPL-MCNC: 130 MG/DL — HIGH (ref 70–99)
HCG SERPL-ACNC: <2 MIU/ML — SIGNIFICANT CHANGE UP
HCT VFR BLD CALC: 36 % — SIGNIFICANT CHANGE UP (ref 34.5–45)
HGB BLD-MCNC: 11.2 G/DL — LOW (ref 11.5–15.5)
IMM GRANULOCYTES NFR BLD AUTO: 0.1 % — SIGNIFICANT CHANGE UP (ref 0–0.9)
LIDOCAIN IGE QN: 34 U/L — SIGNIFICANT CHANGE UP (ref 7–60)
LYMPHOCYTES # BLD AUTO: 2.04 K/UL — SIGNIFICANT CHANGE UP (ref 1–3.3)
LYMPHOCYTES # BLD AUTO: 27.2 % — SIGNIFICANT CHANGE UP (ref 13–44)
MCHC RBC-ENTMCNC: 23.9 PG — LOW (ref 27–34)
MCHC RBC-ENTMCNC: 31.1 G/DL — LOW (ref 32–36)
MCV RBC AUTO: 76.9 FL — LOW (ref 80–100)
MONOCYTES # BLD AUTO: 0.25 K/UL — SIGNIFICANT CHANGE UP (ref 0–0.9)
MONOCYTES NFR BLD AUTO: 3.3 % — SIGNIFICANT CHANGE UP (ref 2–14)
NEUTROPHILS # BLD AUTO: 5.14 K/UL — SIGNIFICANT CHANGE UP (ref 1.8–7.4)
NEUTROPHILS NFR BLD AUTO: 68.4 % — SIGNIFICANT CHANGE UP (ref 43–77)
NRBC # BLD: 0 /100 WBCS — SIGNIFICANT CHANGE UP (ref 0–0)
PLATELET # BLD AUTO: 346 K/UL — SIGNIFICANT CHANGE UP (ref 150–400)
POTASSIUM SERPL-MCNC: 2.8 MMOL/L — CRITICAL LOW (ref 3.5–5.3)
POTASSIUM SERPL-MCNC: 2.9 MMOL/L — CRITICAL LOW (ref 3.5–5.3)
POTASSIUM SERPL-MCNC: 3 MMOL/L — LOW (ref 3.5–5.3)
POTASSIUM SERPL-MCNC: 4.1 MMOL/L — SIGNIFICANT CHANGE UP (ref 3.5–5.3)
POTASSIUM SERPL-SCNC: 2.8 MMOL/L — CRITICAL LOW (ref 3.5–5.3)
POTASSIUM SERPL-SCNC: 2.9 MMOL/L — CRITICAL LOW (ref 3.5–5.3)
POTASSIUM SERPL-SCNC: 3 MMOL/L — LOW (ref 3.5–5.3)
POTASSIUM SERPL-SCNC: 4.1 MMOL/L — SIGNIFICANT CHANGE UP (ref 3.5–5.3)
PROT SERPL-MCNC: 8 G/DL — SIGNIFICANT CHANGE UP (ref 6–8.3)
RBC # BLD: 4.68 M/UL — SIGNIFICANT CHANGE UP (ref 3.8–5.2)
RBC # FLD: 17.1 % — HIGH (ref 10.3–14.5)
SODIUM SERPL-SCNC: 132 MMOL/L — LOW (ref 135–145)
SODIUM SERPL-SCNC: 135 MMOL/L — SIGNIFICANT CHANGE UP (ref 135–145)
SODIUM SERPL-SCNC: 138 MMOL/L — SIGNIFICANT CHANGE UP (ref 135–145)
TROPONIN T, HIGH SENSITIVITY RESULT: 7 NG/L — SIGNIFICANT CHANGE UP (ref 0–51)
WBC # BLD: 7.51 K/UL — SIGNIFICANT CHANGE UP (ref 3.8–10.5)
WBC # FLD AUTO: 7.51 K/UL — SIGNIFICANT CHANGE UP (ref 3.8–10.5)

## 2024-12-06 PROCEDURE — 99223 1ST HOSP IP/OBS HIGH 75: CPT

## 2024-12-06 PROCEDURE — 74177 CT ABD & PELVIS W/CONTRAST: CPT | Mod: 26,MC

## 2024-12-06 RX ORDER — GLUCOSAMINE SULFATE DIPOT CHLR 500 MG
1 CAPSULE ORAL ONCE
Refills: 0 | Status: COMPLETED | OUTPATIENT
Start: 2024-12-06 | End: 2024-12-06

## 2024-12-06 RX ORDER — GLUCOSAMINE SULFATE DIPOT CHLR 500 MG
1 CAPSULE ORAL ONCE
Refills: 0 | Status: DISCONTINUED | OUTPATIENT
Start: 2024-12-06 | End: 2024-12-06

## 2024-12-06 RX ORDER — LANOLIN ALCOHOL/MO/W.PET/CERES
100 CREAM (GRAM) TOPICAL ONCE
Refills: 0 | Status: DISCONTINUED | OUTPATIENT
Start: 2024-12-06 | End: 2024-12-06

## 2024-12-06 RX ORDER — 0.9 % SODIUM CHLORIDE 0.9 %
1000 INTRAVENOUS SOLUTION INTRAVENOUS
Refills: 0 | Status: DISCONTINUED | OUTPATIENT
Start: 2024-12-06 | End: 2024-12-10

## 2024-12-06 RX ORDER — CYANOCOBALAMIN/FOLIC AC/VIT B6 1-2.2-25MG
1 TABLET ORAL ONCE
Refills: 0 | Status: COMPLETED | OUTPATIENT
Start: 2024-12-06 | End: 2024-12-06

## 2024-12-06 RX ORDER — 0.9 % SODIUM CHLORIDE 0.9 %
1000 INTRAVENOUS SOLUTION INTRAVENOUS
Refills: 0 | Status: DISCONTINUED | OUTPATIENT
Start: 2024-12-06 | End: 2024-12-06

## 2024-12-06 RX ORDER — ONDANSETRON HYDROCHLORIDE 4 MG/1
4 TABLET, FILM COATED ORAL ONCE
Refills: 0 | Status: COMPLETED | OUTPATIENT
Start: 2024-12-06 | End: 2024-12-06

## 2024-12-06 RX ORDER — POTASSIUM CHLORIDE 600 MG/1
10 TABLET, EXTENDED RELEASE ORAL
Refills: 0 | Status: COMPLETED | OUTPATIENT
Start: 2024-12-06 | End: 2024-12-06

## 2024-12-06 RX ORDER — LIDOCAINE 40 MG/G
10 CREAM TOPICAL ONCE
Refills: 0 | Status: COMPLETED | OUTPATIENT
Start: 2024-12-06 | End: 2024-12-06

## 2024-12-06 RX ORDER — METOCLOPRAMIDE HYDROCHLORIDE 10 MG/1
10 TABLET ORAL ONCE
Refills: 0 | Status: COMPLETED | OUTPATIENT
Start: 2024-12-06 | End: 2024-12-06

## 2024-12-06 RX ORDER — LANOLIN ALCOHOL/MO/W.PET/CERES
100 CREAM (GRAM) TOPICAL ONCE
Refills: 0 | Status: COMPLETED | OUTPATIENT
Start: 2024-12-06 | End: 2024-12-06

## 2024-12-06 RX ORDER — PANTOPRAZOLE SODIUM 40 MG/1
40 TABLET, DELAYED RELEASE ORAL ONCE
Refills: 0 | Status: COMPLETED | OUTPATIENT
Start: 2024-12-06 | End: 2024-12-06

## 2024-12-06 RX ORDER — 0.9 % SODIUM CHLORIDE 0.9 %
1000 INTRAVENOUS SOLUTION INTRAVENOUS ONCE
Refills: 0 | Status: COMPLETED | OUTPATIENT
Start: 2024-12-06 | End: 2024-12-06

## 2024-12-06 RX ORDER — POTASSIUM CHLORIDE 600 MG/1
40 TABLET, EXTENDED RELEASE ORAL ONCE
Refills: 0 | Status: COMPLETED | OUTPATIENT
Start: 2024-12-06 | End: 2024-12-06

## 2024-12-06 RX ORDER — FAMOTIDINE 20 MG/1
20 TABLET, FILM COATED ORAL ONCE
Refills: 0 | Status: COMPLETED | OUTPATIENT
Start: 2024-12-06 | End: 2024-12-06

## 2024-12-06 RX ORDER — SODIUM CHLORIDE 9 MG/ML
1000 INJECTION, SOLUTION INTRAMUSCULAR; INTRAVENOUS; SUBCUTANEOUS ONCE
Refills: 0 | Status: COMPLETED | OUTPATIENT
Start: 2024-12-06 | End: 2024-12-06

## 2024-12-06 RX ORDER — SUCRALFATE 1 G/1
1 TABLET ORAL ONCE
Refills: 0 | Status: COMPLETED | OUTPATIENT
Start: 2024-12-06 | End: 2024-12-06

## 2024-12-06 RX ORDER — ACETAMINOPHEN 500MG 500 MG/1
1000 TABLET, COATED ORAL ONCE
Refills: 0 | Status: COMPLETED | OUTPATIENT
Start: 2024-12-06 | End: 2024-12-06

## 2024-12-06 RX ADMIN — Medication 1000 MILLILITER(S): at 03:03

## 2024-12-06 RX ADMIN — POTASSIUM CHLORIDE 100 MILLIEQUIVALENT(S): 600 TABLET, EXTENDED RELEASE ORAL at 03:23

## 2024-12-06 RX ADMIN — POTASSIUM CHLORIDE 100 MILLIEQUIVALENT(S): 600 TABLET, EXTENDED RELEASE ORAL at 23:20

## 2024-12-06 RX ADMIN — Medication 100 MILLIGRAM(S): at 03:03

## 2024-12-06 RX ADMIN — POTASSIUM CHLORIDE 100 MILLIEQUIVALENT(S): 600 TABLET, EXTENDED RELEASE ORAL at 05:48

## 2024-12-06 RX ADMIN — POTASSIUM CHLORIDE 100 MILLIEQUIVALENT(S): 600 TABLET, EXTENDED RELEASE ORAL at 20:48

## 2024-12-06 RX ADMIN — ACETAMINOPHEN 500MG 1000 MILLIGRAM(S): 500 TABLET, COATED ORAL at 08:00

## 2024-12-06 RX ADMIN — Medication 1 TABLET(S): at 03:03

## 2024-12-06 RX ADMIN — POTASSIUM CHLORIDE 10 MILLIEQUIVALENT(S): 600 TABLET, EXTENDED RELEASE ORAL at 13:11

## 2024-12-06 RX ADMIN — SODIUM CHLORIDE 1000 MILLILITER(S): 9 INJECTION, SOLUTION INTRAMUSCULAR; INTRAVENOUS; SUBCUTANEOUS at 10:32

## 2024-12-06 RX ADMIN — Medication 2 GRAM(S): at 15:44

## 2024-12-06 RX ADMIN — PANTOPRAZOLE SODIUM 40 MILLIGRAM(S): 40 TABLET, DELAYED RELEASE ORAL at 20:48

## 2024-12-06 RX ADMIN — POTASSIUM CHLORIDE 100 MILLIEQUIVALENT(S): 600 TABLET, EXTENDED RELEASE ORAL at 04:32

## 2024-12-06 RX ADMIN — POTASSIUM CHLORIDE 100 MILLIEQUIVALENT(S): 600 TABLET, EXTENDED RELEASE ORAL at 12:02

## 2024-12-06 RX ADMIN — ONDANSETRON HYDROCHLORIDE 4 MILLIGRAM(S): 4 TABLET, FILM COATED ORAL at 08:07

## 2024-12-06 RX ADMIN — Medication 1 MILLIGRAM(S): at 04:30

## 2024-12-06 RX ADMIN — POTASSIUM CHLORIDE 40 MILLIEQUIVALENT(S): 600 TABLET, EXTENDED RELEASE ORAL at 03:22

## 2024-12-06 RX ADMIN — Medication 125 MILLILITER(S): at 20:48

## 2024-12-06 RX ADMIN — Medication 1000 MILLILITER(S): at 08:33

## 2024-12-06 RX ADMIN — Medication 250 MILLILITER(S): at 04:33

## 2024-12-06 RX ADMIN — POTASSIUM CHLORIDE 100 MILLIEQUIVALENT(S): 600 TABLET, EXTENDED RELEASE ORAL at 11:16

## 2024-12-06 RX ADMIN — LIDOCAINE 10 MILLILITER(S): 40 CREAM TOPICAL at 12:03

## 2024-12-06 RX ADMIN — METOCLOPRAMIDE HYDROCHLORIDE 10 MILLIGRAM(S): 10 TABLET ORAL at 22:39

## 2024-12-06 RX ADMIN — SODIUM CHLORIDE 1000 MILLILITER(S): 9 INJECTION, SOLUTION INTRAMUSCULAR; INTRAVENOUS; SUBCUTANEOUS at 11:21

## 2024-12-06 RX ADMIN — POTASSIUM CHLORIDE 100 MILLIEQUIVALENT(S): 600 TABLET, EXTENDED RELEASE ORAL at 09:17

## 2024-12-06 RX ADMIN — Medication 4 MILLIGRAM(S): at 02:33

## 2024-12-06 RX ADMIN — ONDANSETRON HYDROCHLORIDE 4 MILLIGRAM(S): 4 TABLET, FILM COATED ORAL at 12:34

## 2024-12-06 RX ADMIN — Medication 25 GRAM(S): at 13:44

## 2024-12-06 RX ADMIN — Medication 4 MILLIGRAM(S): at 04:00

## 2024-12-06 RX ADMIN — POTASSIUM CHLORIDE 10 MILLIEQUIVALENT(S): 600 TABLET, EXTENDED RELEASE ORAL at 11:00

## 2024-12-06 RX ADMIN — POTASSIUM CHLORIDE 100 MILLIEQUIVALENT(S): 600 TABLET, EXTENDED RELEASE ORAL at 22:01

## 2024-12-06 RX ADMIN — SUCRALFATE 1 GRAM(S): 1 TABLET ORAL at 05:48

## 2024-12-06 RX ADMIN — ONDANSETRON HYDROCHLORIDE 4 MILLIGRAM(S): 4 TABLET, FILM COATED ORAL at 20:47

## 2024-12-06 RX ADMIN — PANTOPRAZOLE SODIUM 40 MILLIGRAM(S): 40 TABLET, DELAYED RELEASE ORAL at 05:48

## 2024-12-06 RX ADMIN — FAMOTIDINE 20 MILLIGRAM(S): 20 TABLET, FILM COATED ORAL at 02:33

## 2024-12-06 RX ADMIN — ACETAMINOPHEN 500MG 400 MILLIGRAM(S): 500 TABLET, COATED ORAL at 06:42

## 2024-12-06 RX ADMIN — METOCLOPRAMIDE HYDROCHLORIDE 10 MILLIGRAM(S): 10 TABLET ORAL at 02:33

## 2024-12-06 RX ADMIN — Medication 100 MILLILITER(S): at 03:03

## 2024-12-06 NOTE — ED CDU PROVIDER DISPOSITION NOTE - NSFOLLOWUPINSTRUCTIONS_ED_ALL_ED_FT
**You were seen in the emergency department for abdominal pain and vomiting.  Your labs showed a low potassium level.  The surgery team recommended you to continue taking medications to coat the stomach and reduce acid.  Please take sucralfate (Carafate) 4 times per day 1 hour before eating a meal as prescribed. Please take pantoprazole first thing in the morning on an empty stomach at least 30 minutes BEFORE taking sucralfate (Carafate).     **For now, please do NOT take your hydrochlorothiazide as this can further lower your potassium.  Please follow-up with your primary care provider regarding medications for your blood pressure.    **STOP taking Ozempic and follow-up with the provider who prescribed this medication to discuss further treatment options.    1) With your primary care provider in 2 to 3 days.  Follow-up with gastroenterology in 1 to 2 weeks.    2) Continue to take simvastatin as prescribed.    Start taking potassium supplements as prescribed, if you notice increased pain in the abdomen, frequent vomiting, vomiting blood, dark black stools, or bloody stools stop taking this medication immediately and return to the emergency department.  Do not take additional medication other than what you were prescribed without talking to your primary care provider or another outpatient specialist.  Especially do not take hyoscyamine while taking potassium supplements.    3) return to the ER for any new or worsening symptoms, abdominal pain, vomiting, fevers, chills, black or bloody stools, unable to eat or drink, or any other concerns.

## 2024-12-06 NOTE — ED PROVIDER NOTE - CLINICAL SUMMARY MEDICAL DECISION MAKING FREE TEXT BOX
44 year old woman PMH HTN, HLD, gastric sleeve 2018, cholecystectomy 2019 presenting with abdominal pain for 2 days, nausea, vomiting, diffuse abdominal tenderness concerning for gastropathy vs gastritis vs pancreatitis vs SBO vs retained sarahi stone, less likely ACS. CBC, CMP, lipase, hcg, CT AP with oral and IV contrast.

## 2024-12-06 NOTE — CONSULT NOTE ADULT - ATTENDING COMMENTS
44F hx sleeve gastrectomy 2018 (Dr. Mason) now with 3 days of nausea, vomiting, epigastric pain  Recently upped her ozempic dose (4 days ago)  No diarrhea  No fevers    Afebrile  NAD  Abd soft, nondistended, minimally tender to palpation in epigastrium, no guarding or rebound    Labs reviewed    CT reviewed -- normal sleeve anatomy, no hiatal hernia, no obstruction or other pathology    Likely effects from ozempic    Rec IVF, banana bag, zofran  Pt to f/u with her PCP  Hold ozempic for time being    Jean Marie Greene MD

## 2024-12-06 NOTE — ED CDU PROVIDER INITIAL DAY NOTE - OBJECTIVE STATEMENT
44 year old woman PMH HTN, HLD, gastric sleeve 2018 (Dr. Mason), cholecystectomy 2019 presenting with abdominal pain for 2 days. SHe developed severe upper abdominal pain 2 days ago associated with anusea and vomiting, cannot tolerate PO intake. Denies diarrhea, has been having bowel movements, denies dysuria, chest pain, SOB, fevers, chills. Went to OSH with negative CT scan and gastro follow up, pain not well controlled with sucralfate. also recently started taking ozempic.  ED Course: ED course: Labs significant for potassium 2.8, magnesium 2, lipase, troponin, hCG all within normal limits, pH 7.43, lactate 1.8.  Remainder of labs nonactionable.  CT abdomen and pelvis with p.o. and IV contrast showing no acute intra-abdominal pathology.  Patient was evaluated by bariatric surgery team who recommended electrolyte repletion Carafate and PPI, hold Ozempic, no acute surgical intervention.  In the ED patient received morphine, acetaminophen, famotidine, pantoprazole, sucralfate, thiamine, folic acid, multivitamin IV with crystalloid fluid, 1 L of lactated ringer, 40 mEq PO Kclx1,  10 mEq KCl IV x 3 with repeat potassium from 2.8 to 2.9.  Patient vomited after attempting p.o. challenge with crackers.   ECG independently reviewed showing normal sinus rhythm 82 bpm, , QRS 90, QTc 448, TWI in V4 V6 3, small U wave. patient sent to CDU for continued symptomatic management, IV hydration, electrolyte repletion w/ tele monitoring, advancement of diet, frequent evaluation. Pt is on hydrochlorothiazide however given hypokalemia will hold this time.   CDU Course: ___ 44 year old woman PMH HTN, HLD, gastric sleeve 2018 (Dr. Mason), cholecystectomy 2019 presenting with abdominal pain for 2 days. SHe developed severe upper abdominal pain 2 days ago associated with anusea and vomiting, cannot tolerate PO intake. Denies diarrhea, has been having bowel movements, denies dysuria, chest pain, SOB, fevers, chills. Went to OSH with negative CT scan and gastro follow up, pain not well controlled with sucralfate. also recently started taking ozempic.  ED Course: ED course: Labs significant for potassium 2.8, magnesium 2, lipase, troponin, hCG all within normal limits, pH 7.43, lactate 1.8.  Remainder of labs nonactionable.  CT abdomen and pelvis with p.o. and IV contrast showing no acute intra-abdominal pathology.  Patient was evaluated by bariatric surgery team who recommended electrolyte repletion Carafate and PPI, hold Ozempic, no acute surgical intervention.  In the ED patient received morphine, acetaminophen, famotidine, pantoprazole, sucralfate, thiamine, folic acid, multivitamin IV with crystalloid fluid, 1 L of lactated ringer, 40 mEq PO Kclx1,  10 mEq KCl IV x 3 with repeat potassium from 2.8 to 2.9.  Patient vomited after attempting p.o. challenge with crackers.   ECG independently reviewed showing normal sinus rhythm 82 bpm, , QRS 90, QTc 448, TWI in V4 V6 3, small U wave. patient sent to CDU for continued symptomatic management, IV hydration, electrolyte repletion w/ tele monitoring, advancement of diet, frequent evaluation. Pt is on hydrochlorothiazide however given hypokalemia will hold this time.

## 2024-12-06 NOTE — ED ADULT NURSE NOTE - NSFALLRISK_ED_ALL_ED
Refill request received through pharmacy.  The requested medication(s) have been refilled. The refill request(s) was within refill protocol.   
No

## 2024-12-06 NOTE — ED ADULT NURSE REASSESSMENT NOTE - NS ED NURSE REASSESS COMMENT FT1
Received report from Elaine ALEMAN. Pt. A&Ox3, sitting up in stretcher, pt. nauseous and vomiting at this time. MD made aware. Safety and comfort provided.

## 2024-12-06 NOTE — ED CDU PROVIDER INITIAL DAY NOTE - NS ED MD PROGRESS NOTE ADD
Per Ivory   Please call patient and see if she is still taking this medication. At her last appointment it was recommended that she discontinue this medication since she is also on Xarelto now. She is instructed that she did take a half a tablet if she absolutely needed for discomfort, but otherwise was going to take Tylenol.  Please see how often she is taking the meloxicam. Add Progress Note...

## 2024-12-06 NOTE — ED PROVIDER NOTE - PROGRESS NOTE DETAILS
Ricco JACQUES PGY1: surgery consulted, will see patient. Received signout on this 44-year-old female who has a history of gastric sleeve here with nausea vomiting patient recently increased her Ozempic dose.  Patient reassessed this morning she is actively vomiting patient is hypokalemic will page surgery Maranda PGY3 - Repeat BMP with potassium 2.9 and persistently low.  Patient with ongoing vomiting.  Bariatric surgery consulted.  Additional potassium ordered for electrolyte repletion.  CDU for electrolyte repletion, bariatric input.

## 2024-12-06 NOTE — ED CDU PROVIDER DISPOSITION NOTE - CLINICAL COURSE
44 year old woman PMH HTN, HLD, gastric sleeve 2018 (Dr. Mason), cholecystectomy 2019 presenting with abdominal pain for 2 days. SHe developed severe upper abdominal pain 2 days ago associated with anusea and vomiting, cannot tolerate PO intake. Denies diarrhea, has been having bowel movements, denies dysuria, chest pain, SOB, fevers, chills. Went to OSH with negative CT scan and gastro follow up, pain not well controlled with sucralfate. also recently started taking ozempic.  	ED Course: ED course: Labs significant for potassium 2.8, magnesium 2, lipase, troponin, hCG all within normal limits, pH 7.43, lactate 1.8.  Remainder of labs nonactionable.  CT abdomen and pelvis with p.o. and IV contrast showing no acute intra-abdominal pathology.  Patient was evaluated by bariatric surgery team who recommended electrolyte repletion Carafate and PPI, hold Ozempic, no acute surgical intervention.  In the ED patient received morphine, acetaminophen, famotidine, pantoprazole, sucralfate, thiamine, folic acid, multivitamin IV with crystalloid fluid, 1 L of lactated ringer, 40 mEq PO Kclx1,  10 mEq KCl IV x 3 with repeat potassium from 2.8 to 2.9.  Patient vomited after attempting p.o. challenge with crackers.   ECG independently reviewed showing normal sinus rhythm 82 bpm, , QRS 90, QTc 448, TWI in V4 V6 3, small U wave. patient sent to CDU for continued symptomatic management, IV hydration, electrolyte repletion w/ tele monitoring, advancement of diet, frequent evaluation. Pt is on hydrochlorothiazide however given hypokalemia will hold this time.   CDU Course: ____ 44 year old woman PMH HTN, HLD, gastric sleeve 2018 (Dr. Mason), cholecystectomy 2019 presenting with abdominal pain for 2 days. SHe developed severe upper abdominal pain 2 days ago associated with anusea and vomiting, cannot tolerate PO intake. Denies diarrhea, has been having bowel movements, denies dysuria, chest pain, SOB, fevers, chills. Went to OSH with negative CT scan and gastro follow up, pain not well controlled with sucralfate. also recently started taking ozempic.  	ED Course: ED course: Labs significant for potassium 2.8, magnesium 2, lipase, troponin, hCG all within normal limits, pH 7.43, lactate 1.8.  Remainder of labs nonactionable.  CT abdomen and pelvis with p.o. and IV contrast showing no acute intra-abdominal pathology.  Patient was evaluated by bariatric surgery team who recommended electrolyte repletion Carafate and PPI, hold Ozempic, no acute surgical intervention.  In the ED patient received morphine, acetaminophen, famotidine, pantoprazole, sucralfate, thiamine, folic acid, multivitamin IV with crystalloid fluid, 1 L of lactated ringer, 40 mEq PO Kclx1,  10 mEq KCl IV x 3 with repeat potassium from 2.8 to 2.9.  Patient vomited after attempting p.o. challenge with crackers.   ECG independently reviewed showing normal sinus rhythm 82 bpm, , QRS 90, QTc 448, TWI in V4 V6 3, small U wave. patient sent to CDU for continued symptomatic management, IV hydration, electrolyte repletion w/ tele monitoring, advancement of diet, frequent evaluation. Pt is on hydrochlorothiazide however given hypokalemia will hold this time.   CDU Course: While in CDU patient given IV hydration and symptomatic care. Unable to tolerate PO. Will admit to medicine.

## 2024-12-06 NOTE — ED ADULT TRIAGE NOTE - CHIEF COMPLAINT QUOTE
nausea/vomiting, abdominal pain, received CT at [facility], negative CT, discharged with follow up with GI

## 2024-12-06 NOTE — ED CDU PROVIDER INITIAL DAY NOTE - PHYSICAL EXAMINATION
GEN: Pt in NAD, non-toxic.  PSYCH: Affect appropriate.  EYES: Sclera white w/o injection.   ENT: Head NCAT. Neck supple FROM.  RESP: CTA b/l, no wheezes, rales, or rhonchi.   CARDIAC: RRR, clear distinct S1, S2, no appreciable murmurs.  ABD: Abdomen soft, epigastric ttp, no rebound or guarding.  VASC: No edema or calf tenderness.  SKIN: No notable rash. GEN: Pt in NAD, non-toxic.  PSYCH: Affect appropriate.  EYES: Sclera white w/o injection.   ENT: Head NCAT. Oral mucous membranes mildly dry. Neck supple FROM.  RESP: CTA b/l, no wheezes, rales, or rhonchi.   CARDIAC: RRR, clear distinct S1, S2, no appreciable murmurs.  ABD: Abdomen soft, epigastric ttp, no rebound or guarding.  VASC: No edema or calf tenderness.  SKIN: No notable rash.

## 2024-12-06 NOTE — ED CDU PROVIDER INITIAL DAY NOTE - DETAILS
symptomatic management, IV hydration, electrolyte repletion w/ tele monitoring, advancement of diet, frequent evaluation

## 2024-12-06 NOTE — ED PROVIDER NOTE - ATTENDING CONTRIBUTION TO CARE
44-year-old female with history of gastric sleeve here for persistent epigastric abdominal pain, burning in nature, nonradiating.  States was seen at [facility] where she had negative CT abdomen pelvis with oral and IV contrast done and diagnosed with gastropathy told to follow-up outpatient with GI which she has not been able to do so.  Patient states symptoms been going on since Tuesday and started after having spicy chips.  Since then has been having nausea, nonbloody nonbilious emesis and decreased p.o.  States feel weak because of the pain.  Denies chest pain or shortness of breath.  Denies fever or chills.  History further complicated by prior cholecystectomy as well as patient being on Ozempic with recent dose increase.    Hemodynamically stable. NAD. AAOx4 (person, place, time, event). PERRL 3mm, EOMI w/o nystagmus, well hydrated, RRR, no audible cardiac murmurs. clear lungs, no inc work of breathing. benign abdomen except for mild epigastric tenderness palpation, - nguyen, no peritoneal signs, no cva ttp. no visible rashes nor deformities, no signs of jaundice, fluid overload, nor anemia. symm calves, no edema. full str/rom/neurovasc all 4 extrem. Steady gait.    History exam is suggestive of gastropathy, complicated by slow gastric motility.  Eval for evidence of choledocholithiasis versus pancreatitis.  Doubt complication with gastric sleeve itself given just had CT imaging done day prior which was nonactionable.  Plan for hydration, thiamine, folic acid, multivitamin, CT ab pelvis oral and IV contrast, antacid reassess.  Summary of presentation, physical exam findings, plan, expected turn around time for diagnostics discussed with patient. Agrees.

## 2024-12-06 NOTE — ED ADULT NURSE REASSESSMENT NOTE - NS ED NURSE REASSESS COMMENT FT1
Pt received from LOVE Osorio. Pt oriented to CDU & plan of care was discussed. Pt A&O x 4. Ambulatory. Pt in CDU for telemetry, electrolyte repletion, antiemetics an IV hydration . Pt denies any abdominal pain. Patient verbalized nausea and acid reflux. See eMar. V/S stable, pt afebrile,  IV in place, patent and free of signs of infiltration. Pt resting in bed. Safety & comfort measures maintained. Call bell in reach. Care continues. Pt received from LOVE Osorio. Pt oriented to CDU & plan of care was discussed. Pt A&O x 4. Ambulatory. Pt in CDU for telemetry, electrolyte repletion, antiemetics and IV hydration. Pt denies any abdominal pain/discomfort. Patient verbalized nausea and acid reflux. See eMar. V/S stable, pt afebrile,  IV in place, patent and free of signs of infiltration. Pt resting in bed. Safety & comfort measures maintained. Call bell in reach. Care continues.

## 2024-12-06 NOTE — ED ADULT NURSE NOTE - OBJECTIVE STATEMENT
Patient is a 44 year old female complaining of abdominal pain. Patient reports abdominal pain started Tuesday associated with nausea and vomiting, cannot tolerate PO intake. Patient went to [facility] ED with negative CT scan and advised to follow up with gastro. Patient also recently increased her ozempic dose. On assessment patient is A&Ox4, breathing comfortably on room air, no accessory muscle use, no cough, chest rise and fall equal, NSR on the cardiac monitor, no JVD, no edema noted, strong bilateral peripheral pulses, abdomen soft tenderness diffusely worst in epigastric area, sin warm and normal for race. Patient denies headache, dizziness, chest pain, palpitations, cough, SOB, urinary symptoms, fevers, chills, weakness at this time. PMH HTN, HLD, gastric sleeve 2018, cholecystectomy 2019.

## 2024-12-06 NOTE — ED ADULT TRIAGE NOTE - ESI TRIAGE ACUITY LEVEL, MLM
Problem: Patient Care Overview  Goal: Plan of Care/Patient Progress Review  Outcome: No Change  Up with 1 assist/walker, alert & oriented x 3, disoriented to situation, spitting on the floor, very impulsive ,VSS on RA, denies pain, nausea and shortness of breath, lung sound diminished, incontinence of urine, G-tube clamped, dressing CDI, t- feed bolus at 9am- 2pm and 7pm, BG 91, apple juice offered but patient decline oral intake,discharge pending placement to TCU.       2

## 2024-12-06 NOTE — ED PROVIDER NOTE - OBJECTIVE STATEMENT
44 year old woman PMH HTN, HLD, gastric sleeve 2018, cholecystectomy 2019 presenting with abdominal pain for 2 days. SHe developed severe upper abdominal pain 2 days ago associated with anusea and vomiting, cannot tolerate PO intake. Denies diarrhea, has been having bowel movements, denies dysuria, chest pain, SOB, fevers, chills. Went to [facility] ED with negative CT scan and gastro follow up, pain not well controlled with sucralfate. also recently started taking ozempic.

## 2024-12-06 NOTE — ED PROVIDER NOTE - PHYSICAL EXAMINATION
Physical Exam:  Gen: in acute distress, AOx3, nontoxic appearing  Head: NCAT  HEENT: EOMI, PEERLA, normal conjunctiva, tongue midline, oral mucosa moist  Lung: CTAB, no respiratory distress, no wheezes/rhonchi/rales B/L, speaking in full sentences  CV: RRR, no murmurs, rubs or gallops  Abd: soft, TTP diffusely worst in epigastric area, ND, no guarding, no rigidity, no rebound tenderness, no CVA tenderness  MSK: no visible deformities, ROM normal in UE/LE, no neck / back pain, calf tenderness  Neuro: No focal sensory or motor deficits  Skin: Warm, well perfused, no rash, no leg swelling

## 2024-12-06 NOTE — ED ADULT NURSE NOTE - CHIEF COMPLAINT QUOTE
nausea/vomiting, abdominal pain, received CT at [facility], negative CT, discharged with follow up with GI

## 2024-12-06 NOTE — ED ADULT NURSE NOTE - ED STAT RN HANDOFF DETAILS 2
Patient  received  alert  and  oriented x4.  Color is good  and  skin warm  to  touch.  She  appears  very  weak  and  tired.  IVF  is  infusing  well .  No  vomiting noted.  Will  continue to monitor  closely.

## 2024-12-06 NOTE — ED CDU PROVIDER INITIAL DAY NOTE - PROGRESS NOTE DETAILS
Patient K hemolyzed at 4.1 repeat 3.0, ordered for additional IV potassium runs with plan to recheck again once complete.  Patient may need additional supplementation after that given still only 3.0.  Patient also attempted to eat crackers and drink ginger ale but had nausea with dry heaving after.  Given she is not tolerating p.o. restarted IV fluids and ordered for additional antiemetics  Ivelisse Rodriguez PA-C Patient now with vomiting despite dose of Zofran.  Will order Reglan at this time, IV K running   Ivelisse Rodriguez PA-C Patient K hemolyzed at 4.1 repeat 3.0, ordered for additional IV potassium runs with plan to recheck again once complete.  Patient may need additional supplementation after that given still only 3.0.  Patient also attempted to eat crackers and drink ginger ale but had nausea with dry heaving after.  Given she is not tolerating p.o. restarted IV fluids and ordered for additional antiemetics, Pacheco Rodriguez PA-C

## 2024-12-06 NOTE — CONSULT NOTE ADULT - ASSESSMENT
44F with history of sleeve gastrectomy presenting with 3 days of vomiting with associated abdominal pain, worst postprandial    Plan:  - Pending CT scan of abdomen with PO contrast   - Banana bag resuscitation   - Electrolyte repletion from vomiting    [facility] [id number] 44F with history of sleeve gastrectomy presenting with 3 days of vomiting with associated abdominal pain     Plan:  - CT scan with PO contrast preliminary without significant abnormalities  - Banana bag resuscitation   - Electrolyte repletion from vomiting  - Carafate and PPI  - Recommend follow up with GI outpatient     [facility] [id number] 44F with history of sleeve gastrectomy presenting with 3 days of vomiting with associated abdominal pain     Plan:  - No acute pathology on CTAP.  - Banana bag resuscitation.  - Electrolyte repletion from vomiting.  - Carafate and PPI.  - Recommend follow up with GI/PCP outpatient.  - Dispo per ED.    Discussed with Dr. Silva and Dr. Greene.    [facility] [id number]

## 2024-12-06 NOTE — ED ADULT TRIAGE NOTE - ISOLATION TYPE:
Name: Magaly Mott  YOB: 1966  Gender: female  MRN: [id number]    Pre-Op     CC: RIGHT HIP PAIN       This patient comes in for pre-op exam prior to RIGHT ANGELICA.  The patient has been cleared preoperatively.  I counseled the patient once again about the risks of infection, DVT formation, expected time of hospitalization, anticipated recovery time as well as rehab needs and expectations for recovery.  The patient would like to proceed and we will do so as planned. The patient was provided with pain medications as well as DVT prophylaxis to have on hand postoperatively at the time of discharge from hospital. All pertinent questions asked by the patient were answered.    MONICA Vigil   
None

## 2024-12-06 NOTE — ED PROVIDER NOTE - NSICDXPASTMEDICALHX_GEN_ALL_CORE_FT
PAST MEDICAL HISTORY:  DM2 (diabetes mellitus, type 2)     Gastric bypass status for obesity     HTN (hypertension)     Morbid obesity with BMI of 40.0-44.9, adult     LATOYA on CPAP

## 2024-12-07 DIAGNOSIS — R11.2 NAUSEA WITH VOMITING, UNSPECIFIED: ICD-10-CM

## 2024-12-07 LAB
MAGNESIUM SERPL-MCNC: 2.2 MG/DL — SIGNIFICANT CHANGE UP (ref 1.6–2.6)
POTASSIUM SERPL-MCNC: 3.4 MMOL/L — LOW (ref 3.5–5.3)
POTASSIUM SERPL-SCNC: 3.4 MMOL/L — LOW (ref 3.5–5.3)

## 2024-12-07 PROCEDURE — 99232 SBSQ HOSP IP/OBS MODERATE 35: CPT

## 2024-12-07 PROCEDURE — 99233 SBSQ HOSP IP/OBS HIGH 50: CPT

## 2024-12-07 RX ORDER — SIMVASTATIN 10 MG/1
1 TABLET, FILM COATED ORAL
Refills: 0 | DISCHARGE

## 2024-12-07 RX ORDER — HYDROCHLOROTHIAZIDE 50 MG
1 TABLET ORAL
Refills: 0 | DISCHARGE

## 2024-12-07 RX ORDER — ONDANSETRON HYDROCHLORIDE 4 MG/1
4 TABLET, FILM COATED ORAL EVERY 8 HOURS
Refills: 0 | Status: DISCONTINUED | OUTPATIENT
Start: 2024-12-07 | End: 2024-12-10

## 2024-12-07 RX ORDER — FAMOTIDINE 20 MG/1
20 TABLET, FILM COATED ORAL ONCE
Refills: 0 | Status: COMPLETED | OUTPATIENT
Start: 2024-12-07 | End: 2024-12-07

## 2024-12-07 RX ORDER — SUCRALFATE 1 G/1
10 TABLET ORAL
Refills: 0 | DISCHARGE

## 2024-12-07 RX ORDER — METOCLOPRAMIDE HYDROCHLORIDE 10 MG/1
10 TABLET ORAL ONCE
Refills: 0 | Status: COMPLETED | OUTPATIENT
Start: 2024-12-07 | End: 2024-12-07

## 2024-12-07 RX ORDER — AMLODIPINE BESYLATE 10 MG/1
2.5 TABLET ORAL DAILY
Refills: 0 | Status: DISCONTINUED | OUTPATIENT
Start: 2024-12-07 | End: 2024-12-10

## 2024-12-07 RX ORDER — ESOMEPRAZOLE MAGNESIUM 20 MG/1
1 CAPSULE, DELAYED RELEASE ORAL
Refills: 0 | DISCHARGE

## 2024-12-07 RX ORDER — METOCLOPRAMIDE HYDROCHLORIDE 10 MG/1
10 TABLET ORAL
Refills: 0 | Status: DISCONTINUED | OUTPATIENT
Start: 2024-12-07 | End: 2024-12-10

## 2024-12-07 RX ADMIN — FAMOTIDINE 20 MILLIGRAM(S): 20 TABLET, FILM COATED ORAL at 06:34

## 2024-12-07 RX ADMIN — POTASSIUM CHLORIDE 10 MILLIEQUIVALENT(S): 600 TABLET, EXTENDED RELEASE ORAL at 00:44

## 2024-12-07 RX ADMIN — METOCLOPRAMIDE HYDROCHLORIDE 10 MILLIGRAM(S): 10 TABLET ORAL at 06:31

## 2024-12-07 RX ADMIN — Medication 10 MILLIGRAM(S): at 21:29

## 2024-12-07 RX ADMIN — ONDANSETRON HYDROCHLORIDE 4 MILLIGRAM(S): 4 TABLET, FILM COATED ORAL at 13:38

## 2024-12-07 NOTE — H&P ADULT - NSHPPHYSICALEXAM_GEN_ALL_CORE
T(C): 37.5 (12-07-24 @ 11:41), Max: 37.9 (12-06-24 @ 16:34)  HR: 88 (12-07-24 @ 11:41) (88 - 94)  BP: 144/89 (12-07-24 @ 11:41) (134/85 - 173/96)  RR: 18 (12-07-24 @ 11:41) (18 - 20)  SpO2: 100% (12-07-24 @ 11:41) (96% - 100%)    PHYSICAL EXAM:  GENERAL: NAD, well-developed  HEAD:  Atraumatic, Normocephalic  EYES: EOMI, PERRLA, conjunctiva and sclera clear  NECK: Supple, No JVD  CHEST/LUNG: Clear to auscultation bilaterally; No wheeze  HEART: Regular rate and rhythm; No murmurs, rubs, or gallops  ABDOMEN: Soft, Nontender, Nondistended; Bowel sounds present  EXTREMITIES:  2+ Peripheral Pulses, No clubbing, cyanosis, or edema  PSYCH: AAOx3  NEUROLOGY: non-focal  SKIN: No rashes or lesions

## 2024-12-07 NOTE — ED CDU PROVIDER SUBSEQUENT DAY NOTE - PHYSICAL EXAMINATION
CONSTITUTIONAL: Patient is awake, alert and oriented x 3. Patient is well appearing and in no acute distress  HEAD: NCAT  NECK: supple, FROM  LUNGS: CTA b/l, no wheezing or rales   HEART: RRR.+S1S2  ABDOMEN: Soft, non-distended, epigastric ttp otherwise nttp,  no rebound or guarding  EXTREMITY: No edema or calf tenderness b/l, FROM upper and lower ext b/l  SKIN: No rash or lesions  NEURO: No focal deficits

## 2024-12-07 NOTE — PROGRESS NOTE ADULT - ATTENDING COMMENTS
continues with po intolerance  abd soft, NT, ND  CT no evidence of a surgical pathology  being admitted to Medicine  agree with previous recs about holding Ozempic  will sign off, pls call back with questions/concerns

## 2024-12-07 NOTE — ED CDU PROVIDER SUBSEQUENT DAY NOTE - ATTENDING APP SHARED VISIT CONTRIBUTION OF CARE
44F with PO intolerance likely due to medication side effect in setting of slowed gastric motility vs. gastritis complicated by electrolyte derrangement that has now improved. Pt looks like she doesn't feel well but non-toxic but still remains unable to tolerate PO warranting admission at this time. Surgery aware of plan.

## 2024-12-07 NOTE — H&P ADULT - ASSESSMENT
44F with history of sleeve gastrectomy presenting with 3 days of vomiting with associated abdominal pain     Plan:  - No acute pathology on CTAP.  - Banana bag resuscitation.  - Electrolyte repletion from vomiting.  - dc ozempic, PPI, clears, cont IVF  - GI called  - seen by surgery  DVT ppx w sc Lovenox

## 2024-12-07 NOTE — ED ADULT NURSE REASSESSMENT NOTE - NSFALLHARMRISKINTERV_ED_ALL_ED

## 2024-12-07 NOTE — ED CDU PROVIDER SUBSEQUENT DAY NOTE - PROGRESS NOTE DETAILS
Brii Sood, Attending Physician: patient seen and examined this AM at bedside. Pt still symptomatic with nausea, unable to tolerate PO this AM with vomiting. Hypokalemia improved but still mildly hypokalemic. Given patient can not tolerate PO, will admit. Pt evaluated at bedside with Dr. Sood. Unable to tolerate PO. Not improved. TBA medicine unattached. Agreeable with plan. Abby Crystal PA-C

## 2024-12-07 NOTE — ED ADULT NURSE REASSESSMENT NOTE - COMFORT CARE
darkened lights/meal provided/plan of care explained/side rails up/treatment delay explained/wait time explained/warm blanket provided
meal provided/po fluids offered

## 2024-12-07 NOTE — ED ADULT NURSE REASSESSMENT NOTE - NS ED NURSE REASSESS COMMENT FT1
Received a 39F aaox4 ambulatory with h/o HTN HLD, obesity LATOYA on CPAP and lap sleeve gastrectomy in 2018 and lap sarahi in 2019 presenting with 3 days of abdominal pain and vomiting. She reports she ate something spicy on Tuesday and since then has been unable to eat anything else and been having nausea and vomiting. She went to OSH where she reportedly had a normal work up and was told to follow up with her GI. Patient reports she still feeling weak and no appetite to eat. Able to tolerate clear broth without any nausea or vomiting. VS WDL. Patient will be admitted for continued hydration and treatment.

## 2024-12-07 NOTE — ED CDU PROVIDER SUBSEQUENT DAY NOTE - HISTORY
Patient presenting with epigastric pain, N/V, found to be hypokalemic. Given h/o bariatric surgery sx consulted for eval. While in CDU pt with intermittent N/V, improved with Reglan. Last K 3.0, pending repeat and advancement of diet   Ivelisse Rodriguez PA-C

## 2024-12-07 NOTE — H&P ADULT - HISTORY OF PRESENT ILLNESS
44F hx sleeve gastrectomy '18, p/w 3 days of vomiting with associated abdominal pain. Patient now admitted to medicine for continued PO intolerance despite unremarkable imaging. seen by surgery prob 2/2 gastroparesis from using Ozempic 1 mg weekly. will keep on clears and IVF

## 2024-12-08 LAB
ANION GAP SERPL CALC-SCNC: 15 MMOL/L — SIGNIFICANT CHANGE UP (ref 5–17)
BUN SERPL-MCNC: 5 MG/DL — LOW (ref 7–23)
CALCIUM SERPL-MCNC: 8.7 MG/DL — SIGNIFICANT CHANGE UP (ref 8.4–10.5)
CHLORIDE SERPL-SCNC: 93 MMOL/L — LOW (ref 96–108)
CO2 SERPL-SCNC: 24 MMOL/L — SIGNIFICANT CHANGE UP (ref 22–31)
CREAT SERPL-MCNC: 0.36 MG/DL — LOW (ref 0.5–1.3)
EGFR: 128 ML/MIN/1.73M2 — SIGNIFICANT CHANGE UP
GLUCOSE SERPL-MCNC: 87 MG/DL — SIGNIFICANT CHANGE UP (ref 70–99)
POTASSIUM SERPL-MCNC: 3 MMOL/L — LOW (ref 3.5–5.3)
POTASSIUM SERPL-SCNC: 3 MMOL/L — LOW (ref 3.5–5.3)
SODIUM SERPL-SCNC: 132 MMOL/L — LOW (ref 135–145)

## 2024-12-08 RX ORDER — POTASSIUM CHLORIDE 600 MG/1
10 TABLET, EXTENDED RELEASE ORAL
Refills: 0 | Status: COMPLETED | OUTPATIENT
Start: 2024-12-08 | End: 2024-12-08

## 2024-12-08 RX ORDER — PANTOPRAZOLE SODIUM 40 MG/1
40 TABLET, DELAYED RELEASE ORAL DAILY
Refills: 0 | Status: DISCONTINUED | OUTPATIENT
Start: 2024-12-08 | End: 2024-12-10

## 2024-12-08 RX ORDER — POTASSIUM CHLORIDE 600 MG/1
40 TABLET, EXTENDED RELEASE ORAL EVERY 4 HOURS
Refills: 0 | Status: DISCONTINUED | OUTPATIENT
Start: 2024-12-08 | End: 2024-12-08

## 2024-12-08 RX ADMIN — METOCLOPRAMIDE HYDROCHLORIDE 10 MILLIGRAM(S): 10 TABLET ORAL at 18:12

## 2024-12-08 RX ADMIN — Medication 10 MILLIGRAM(S): at 21:01

## 2024-12-08 RX ADMIN — METOCLOPRAMIDE HYDROCHLORIDE 10 MILLIGRAM(S): 10 TABLET ORAL at 12:47

## 2024-12-08 RX ADMIN — POTASSIUM CHLORIDE 100 MILLIEQUIVALENT(S): 600 TABLET, EXTENDED RELEASE ORAL at 17:15

## 2024-12-08 RX ADMIN — METOCLOPRAMIDE HYDROCHLORIDE 10 MILLIGRAM(S): 10 TABLET ORAL at 06:00

## 2024-12-08 RX ADMIN — POTASSIUM CHLORIDE 100 MILLIEQUIVALENT(S): 600 TABLET, EXTENDED RELEASE ORAL at 22:53

## 2024-12-08 RX ADMIN — POTASSIUM CHLORIDE 100 MILLIEQUIVALENT(S): 600 TABLET, EXTENDED RELEASE ORAL at 20:21

## 2024-12-08 RX ADMIN — AMLODIPINE BESYLATE 2.5 MILLIGRAM(S): 10 TABLET ORAL at 06:00

## 2024-12-08 RX ADMIN — POTASSIUM CHLORIDE 40 MILLIEQUIVALENT(S): 600 TABLET, EXTENDED RELEASE ORAL at 12:47

## 2024-12-08 NOTE — CONSULT NOTE ADULT - ASSESSMENT
nausea  vomiting   abdominal pain        - Likely related to ozempic  - Hold ozempic   - NPO  - ivf  -check electrolytes, replete prn  -ppi once a day  - zofran and reglan PRN   Will follow

## 2024-12-09 LAB
ANION GAP SERPL CALC-SCNC: 15 MMOL/L — SIGNIFICANT CHANGE UP (ref 5–17)
ANION GAP SERPL CALC-SCNC: 15 MMOL/L — SIGNIFICANT CHANGE UP (ref 5–17)
BUN SERPL-MCNC: 5 MG/DL — LOW (ref 7–23)
BUN SERPL-MCNC: 6 MG/DL — LOW (ref 7–23)
CALCIUM SERPL-MCNC: 8.6 MG/DL — SIGNIFICANT CHANGE UP (ref 8.4–10.5)
CALCIUM SERPL-MCNC: 8.8 MG/DL — SIGNIFICANT CHANGE UP (ref 8.4–10.5)
CHLORIDE SERPL-SCNC: 95 MMOL/L — LOW (ref 96–108)
CHLORIDE SERPL-SCNC: 97 MMOL/L — SIGNIFICANT CHANGE UP (ref 96–108)
CO2 SERPL-SCNC: 22 MMOL/L — SIGNIFICANT CHANGE UP (ref 22–31)
CO2 SERPL-SCNC: 24 MMOL/L — SIGNIFICANT CHANGE UP (ref 22–31)
CREAT SERPL-MCNC: 0.38 MG/DL — LOW (ref 0.5–1.3)
CREAT SERPL-MCNC: 0.47 MG/DL — LOW (ref 0.5–1.3)
EGFR: 120 ML/MIN/1.73M2 — SIGNIFICANT CHANGE UP
EGFR: 126 ML/MIN/1.73M2 — SIGNIFICANT CHANGE UP
GLUCOSE SERPL-MCNC: 73 MG/DL — SIGNIFICANT CHANGE UP (ref 70–99)
GLUCOSE SERPL-MCNC: 85 MG/DL — SIGNIFICANT CHANGE UP (ref 70–99)
POTASSIUM SERPL-MCNC: 3.5 MMOL/L — SIGNIFICANT CHANGE UP (ref 3.5–5.3)
POTASSIUM SERPL-MCNC: 3.8 MMOL/L — SIGNIFICANT CHANGE UP (ref 3.5–5.3)
POTASSIUM SERPL-SCNC: 3.5 MMOL/L — SIGNIFICANT CHANGE UP (ref 3.5–5.3)
POTASSIUM SERPL-SCNC: 3.8 MMOL/L — SIGNIFICANT CHANGE UP (ref 3.5–5.3)
SODIUM SERPL-SCNC: 134 MMOL/L — LOW (ref 135–145)
SODIUM SERPL-SCNC: 134 MMOL/L — LOW (ref 135–145)

## 2024-12-09 RX ADMIN — METOCLOPRAMIDE HYDROCHLORIDE 10 MILLIGRAM(S): 10 TABLET ORAL at 12:49

## 2024-12-09 RX ADMIN — METOCLOPRAMIDE HYDROCHLORIDE 10 MILLIGRAM(S): 10 TABLET ORAL at 18:24

## 2024-12-09 RX ADMIN — PANTOPRAZOLE SODIUM 40 MILLIGRAM(S): 40 TABLET, DELAYED RELEASE ORAL at 12:49

## 2024-12-09 RX ADMIN — Medication 10 MILLIGRAM(S): at 21:13

## 2024-12-09 RX ADMIN — METOCLOPRAMIDE HYDROCHLORIDE 10 MILLIGRAM(S): 10 TABLET ORAL at 05:16

## 2024-12-09 RX ADMIN — AMLODIPINE BESYLATE 2.5 MILLIGRAM(S): 10 TABLET ORAL at 05:16

## 2024-12-09 NOTE — CHART NOTE - NSCHARTNOTEFT_GEN_A_CORE
ACP at bedside to discuss dietary plan with patient. She tolerated CLD for lunch; pt is without nausea or vomiting. Will attempt full liquid diet for dinner. Discussed incremental increases to tolerate diet. Patient is in agreement with plan.

## 2024-12-09 NOTE — CONSULT NOTE ADULT - ASSESSMENT
44F hx sleeve gastrectomy '18, p/w 3 days of vomiting with associated abdominal pain. Patient now admitted to medicine for continued PO intolerance despite unremarkable imaging. seen by surgery prob 2/2 gastroparesis. Nephro on board for Hyponatremia and Hypokalemia    Hyponatremia  Likely prerenal in nature due to vomiting  Can send Urine  Urine OSM  Can check a Serum OSM, TSH to complete wup  Making urine appears Euvolemic   Mild improved 134 today  On IVF LR @ 125 cc/hour     Hypokalemia  K initially 2.8 now WNL  Encourage high K diet  Likely related to Vomiting     Anemia  Mild 11.2  Monitor  Can check iron studies    Vomiting   Likely related to gastroparesis  Advance diet as tolerated

## 2024-12-09 NOTE — PROGRESS NOTE ADULT - NSPROGADDITIONALINFOA_GEN_ALL_CORE
Clinical documentation and note reviewed and edited where appropriate.  D/W note writer at time of progress note.    No obvious acute surgical pathology.  Disposition per hospitalist team.  May f/u with my office as needed.

## 2024-12-09 NOTE — CONSULT NOTE ADULT - SUBJECTIVE AND OBJECTIVE BOX
SURGERY CONSULT NOTE     HPI: 39F with history of lap sleeve gastrectomy in 2018 and lap sarahi in 2019 presenting with 3 days of abdominal pain and vomiting. She reports she ate something spicy on Tuesday and since then has been unable to eat anything else and been having nausea and vomiting. She went to OSH where she reportedly had a normal work up and was told to follow up with her GI. She then presented here as the pain continued. She also reports intermittent nausea over the last 6 months since she started ozempic. She has not had an upper endoscopy since her surgery. She lost about 190 pounds    Upon arrival to the ED,       PMHx: DM2 (diabetes mellitus, type 2)    HTN (hypertension)    LATOYA on CPAP    Morbid obesity with BMI of 40.0-44.9, adult    Gastric bypass status for obesity      PSHx: No significant past surgical history    History of     S/P laparoscopic sleeve gastrectomy      Medications (inpatient): potassium chloride  10 mEq/100 mL IVPB 10 milliEquivalent(s) IV Intermittent every 1 hour  sodium chloride 0.9% 1000 milliLiter(s) IV Continuous <Continuous>    Medications (PRN):  Allergies: No Known Allergies  (Intolerances: )  Social Hx:   Family Hx:     Physical Exam  T(C): 36.7  HR: 88 (60 - 88)  BP: 151/85 (151/85 - 158/92)  RR: 20 (20 - 22)  SpO2: 99% (99% - 99%)  Tmax: T(C): , Max: 36.9 (12-24 @ 01:14)    General: well developed, well nourished, NAD  Neuro: alert and oriented, no focal deficits, moves all extremities spontaneously  Respiratory: airway patent, respirations unlabored  CVS: regular rate and rhythm  Abdomen: soft, nontender, mildly distended  Extremities: no edema, sensation and movement grossly intact  Skin: warm, dry, appropriate color    Labs:                        11.2   7.51  )-----------( 346      ( 06 Dec 2024 02:29 )             36.0           138  |  97  |  6[L]  ----------------------------<  130[H]  2.8[LL]   |  23  |  0.49[L]    Ca    9.4      06 Dec 2024 02:29  Mg     2.0     12-    TPro  8.0  /  Alb  4.3  /  TBili  0.5  /  DBili  x   /  AST  12  /  ALT  10  /  AlkPhos  44  12-06    Urinalysis Basic - ( 06 Dec 2024 02:29 )    Color: x / Appearance: x / SG: x / pH: x  Gluc: 130 mg/dL / Ketone: x  / Bili: x / Urobili: x   Blood: x / Protein: x / Nitrite: x   Leuk Esterase: x / RBC: x / WBC x   Sq Epi: x / Non Sq Epi: x / Bacteria: x            Imaging and other studies:    
  [facility]    Dr. Gonzalo Freed     Office [phone] (9 am to 5 pm)  Service : [phone] ( 5pm to 9 am)  Also Available on Teams        RENAL INITIAL CONSULT NOTE: DATE OF SERVICE 24 @ 10:05    HPI:  44F hx sleeve gastrectomy '18, p/w 3 days of vomiting with associated abdominal pain. Patient now admitted to medicine for continued PO intolerance despite unremarkable imaging. seen by surgery prob 2/2 gastroparesis from using Ozempic 1 mg weekly. will keep on clears and IVF (07 Dec 2024 13:12)    Nephro on board for Hypokalemia and Hyponatremia    Allergies:  No Known Allergies      PAST MEDICAL & SURGICAL HISTORY:  DM2 (diabetes mellitus, type 2)      HTN (hypertension)      LATOYA on CPAP      Morbid obesity with BMI of 40.0-44.9, adult      Gastric bypass status for obesity      History of   x2      S/P laparoscopic sleeve gastrectomy          Home Medications Reviewed    Hospital Medications:   MEDICATIONS  (STANDING):  amLODIPine   Tablet 2.5 milliGRAM(s) Oral daily  atorvastatin 10 milliGRAM(s) Oral at bedtime  lactated ringers. 1000 milliLiter(s) (125 mL/Hr) IV Continuous <Continuous>  metoclopramide 10 milliGRAM(s) Oral three times a day before meals  pantoprazole  Injectable 40 milliGRAM(s) IV Push daily  sodium chloride 0.9% 1000 milliLiter(s) (250 mL/Hr) IV Continuous <Continuous>      SOCIAL HISTORY:  Denies ETOh, Smoking,     FAMILY HISTORY:      REVIEW OF SYSTEMS:  CONSTITUTIONAL: No weakness, fevers or chills  EYES/ENT: No visual changes;  No vertigo or throat pain   NECK: No pain or stiffness  RESPIRATORY: No cough, wheezing, hemoptysis; No shortness of breath  CARDIOVASCULAR: No chest pain or palpitations.  GASTROINTESTINAL: No abdominal or epigastric pain. No nausea, vomiting, or hematemesis; No diarrhea or constipation. No melena or hematochezia.  GENITOURINARY: No dysuria, frequency, foamy urine, urinary urgency, incontinence or hematuria  NEUROLOGICAL: No numbness or weakness  SKIN: No itching, burning, rashes, or lesions   VASCULAR: No bilateral lower extremity edema.   All other review of systems is negative unless indicated above.    VITALS:  T(F): 98.6 (12-09-24 @ 09:32), Max: 99.6 (24 @ 05:45)  HR: 94 (24 @ 09:32)  BP: 148/85 (24 @ 05:45)  RR: 18 (24 @ 09:32)  SpO2: 99% (24 @ 09:32)  Wt(kg): --     @ 07:01  -   @ 07:00  --------------------------------------------------------  IN: 1500 mL / OUT: 300 mL / NET: 1200 mL          PHYSICAL EXAM:  Constitutional: NAD  HEENT: anicteric sclera, oropharynx clear, MMM  Neck: No JVD  Respiratory: CTAB, no wheezes, rales or rhonchi  Cardiovascular: S1, S2, RRR  Gastrointestinal: BS+, soft, NT/ND  Extremities: No cyanosis or clubbing. No peripheral edema  Neurological: A/O x 3, no focal deficits  Psychiatric: Normal mood, normal affect  : No CVA tenderness. No dodson.   Skin: No rashes  Vascular Access:    LABS:      134[L]  |  95[L]  |  6[L]  ----------------------------<  73  3.5   |  24  |  0.47[L]    Ca    8.8      09 Dec 2024 07:38      Creatinine Trend: 0.47 <--, 0.38 <--, 0.36 <--, 0.34 <--, 0.40 <--, 0.49 <--    Urine Studies:  Urinalysis Basic - ( 09 Dec 2024 07:38 )    Color:  / Appearance:  / SG:  / pH:   Gluc: 73 mg/dL / Ketone:   / Bili:  / Urobili:    Blood:  / Protein:  / Nitrite:    Leuk Esterase:  / RBC:  / WBC    Sq Epi:  / Non Sq Epi:  / Bacteria:           RADIOLOGY & ADDITIONAL STUDIES:                
[facility]      Sherif Bruce Webb NP    [address] [place] [address] [phone]      Chief Complaint:  Patient is a 44y old  Female who presents with a chief complaint of vomiting bad pain, failure to thrive (07 Dec 2024 13:12)      HPI:  39F with history of lap sleeve gastrectomy in 2018 and lap sarahi in 2019 presenting with 3 days of abdominal pain and vomiting. She reports she ate something spicy on Tuesday and since then has been unable to eat anything else and been having nausea and vomiting. She went to OSH where she reportedly had a normal work up and was told to follow up with her GI. She then presented here as the pain continued. She also reports intermittent nausea over the last 6 months since she started ozempic. She has not had an upper endoscopy since her surgery. She lost about 190 pounds    Allergies:  No Known Allergies      Medications:  amLODIPine   Tablet 2.5 milliGRAM(s) Oral daily  atorvastatin 10 milliGRAM(s) Oral at bedtime  lactated ringers. 1000 milliLiter(s) IV Continuous <Continuous>  metoclopramide 10 milliGRAM(s) Oral three times a day before meals  ondansetron Injectable 4 milliGRAM(s) IV Push every 8 hours PRN  sodium chloride 0.9% 1000 milliLiter(s) IV Continuous <Continuous>      PMHX/PSHX:  DM2 (diabetes mellitus, type 2)    HTN (hypertension)    LATOYA on CPAP    Morbid obesity with BMI of 40.0-44.9, adult    Gastric bypass status for obesity    No significant past surgical history    History of     S/P laparoscopic sleeve gastrectomy        Family history:  No pertinent family history in first degree relatives        Social History:     ROS:     General:  No wt loss, fevers, chills, night sweats, fatigue,   Eyes:  Good vision, no reported pain  ENT:  No sore throat, pain, runny nose, dysphagia  CV:  No pain, palpitations, hypo/hypertension  Resp:  No dyspnea, cough, tachypnea, wheezing  GI: as above  :  No pain, bleeding, incontinence, nocturia  Muscle:  No pain, weakness  Neuro:  No weakness, tingling, memory problems  Psych:  No fatigue, insomnia, mood problems, depression  Endocrine:  No polyuria, polydipsia, cold/heat intolerance  Heme:  No petechiae, ecchymosis, easy bruisability  Skin:  No rash, tattoos, scars, edema      PHYSICAL EXAM:   Vital Signs:  Vital Signs Last 24 Hrs  T(C): 36.7 (08 Dec 2024 04:28), Max: 37.5 (07 Dec 2024 11:41)  T(F): 98.1 (08 Dec 2024 04:28), Max: 99.5 (07 Dec 2024 11:41)  HR: 87 (08 Dec 2024 04:) (84 - 95)  BP: 144/89 (08 Dec 2024 04:) (135/78 - 164/113)  BP(mean): --  RR: 18 (08 Dec 2024 04:) (18 - 18)  SpO2: 98% (08 Dec 2024 04:) (96% - 100%)    Parameters below as of 08 Dec 2024 04:28  Patient On (Oxygen Delivery Method): room air      Daily     Daily     GENERAL:  Appears stated age, well-groomed, well-nourished, no distress  HEENT:  NC/AT,  conjunctivae clear and pink, no thyromegaly, nodules, adenopathy, no JVD, sclera -anicteric  CHEST:  Full & symmetric excursion, no increased effort, breath sounds clear  HEART:  Regular rhythm, S1, S2, no murmur/rub/S3/S4, no abdominal bruit, no edema  ABDOMEN:  Soft, pos epigastric tenderness to palpation non rebounding,  non-distended, normoactive bowel sounds,  no masses ,no hepato-splenomegaly, no signs of chronic liver disease  EXTEREMITIES:  no cyanosis,clubbing or edema  SKIN:  No rash/erythema/ecchymoses/petechiae/wounds/abscess/warm/dry  NEURO:  Alert, oriented, no asterixis, no tremor, no encephalopathy    LABS:        x   |  x   |  x   ----------------------------<  x   3.4[L]   |  x   |  x     Ca    8.4      06 Dec 2024 14:14  Mg     2.2               Urinalysis Basic - ( 06 Dec 2024 14:14 )    Color: x / Appearance: x / SG: x / pH: x  Gluc: 101 mg/dL / Ketone: x  / Bili: x / Urobili: x   Blood: x / Protein: x / Nitrite: x   Leuk Esterase: x / RBC: x / WBC x   Sq Epi: x / Non Sq Epi: x / Bacteria: x          Imaging:

## 2024-12-09 NOTE — PATIENT PROFILE ADULT - HAVE YOU RECENTLY LOST WEIGHT WITHOUT TRYING?
Phone: [phone]                       [facility]          Fax: [phone]                      Outpatient Physical Therapy                                                             Lymphedema Treatment  Date: 2023  Patient: Jay Jay Reina  : 1943  CSN #: [id number]  Referring Physician: Referring Provider (secondary): Ingris Norman    Diagnosis: lymphedema I89.0    Treatment Diagnosis: BLE lymphedema  Onset Date: 10/12/23  PT Insurance Information: Medicare/AARP  Total # of Visits Approved: 12   Total # of Visits to Date: 8  No Show: 0  Canceled Appointment: 0     Subjective  Subjective: Pt states he is doing good today. Pt reports his vascular visit went well yesterday, and had the all clear to wear compression socks daily. Pt states his wife had bought some after the appt so he can start wearing them. Pt states he can tell when it is time to come into therapy d/t  the increased edema. Pt reports he had a fall yesterday and has a small scrape on his L shin, no weeping noted. Additional Pertinent Hx: HTN, defibulator, OA, diabetes,    Lymph Assessment  Skin Integumentary:   Location Description: R shin  Skin Integrity: Wound (add Wound LDA)  Pitting Scale Area 1: 1+  Skin Color: Red, Hyperpigmentation  Skin Texture: Hyperkeratosis  Skin Condition/Temp: Dry  Turgor: Shiney/hard  Edema Rebound: Quick  Stemmer Sign: Positive    Signs of Constriction (if applicable):   Papilloma (benign tumor arising from an epithelial layer): No  Fibrotic Areas: No  Lymphorrhea: No    Stage of Lymphedema: Stage 2: Spontaneously Irreversible Stage  Description:      Lymphedema Classification:   Type: Secondary Lymphedema  Left: Moderate     Right: Mild    Measurements: Area Measured: R LE, L LE (R LE: 3\" above 23.7 cm, 6\" above 27.5 cm.  L LE: 3\" above 28 cm, 6\" above 34.1 cm.)      Right Measurements Left Measurements   R LE Pre Girth Measurement (cm)  5th Tuberosity (cm): 22.8  Lateral malleolus: No (0)

## 2024-12-10 ENCOUNTER — TRANSCRIPTION ENCOUNTER (OUTPATIENT)
Age: 44
End: 2024-12-10

## 2024-12-10 VITALS — WEIGHT: 190.04 LBS

## 2024-12-10 LAB
ANION GAP SERPL CALC-SCNC: 13 MMOL/L — SIGNIFICANT CHANGE UP (ref 5–17)
BUN SERPL-MCNC: 6 MG/DL — LOW (ref 7–23)
CALCIUM SERPL-MCNC: 8.6 MG/DL — SIGNIFICANT CHANGE UP (ref 8.4–10.5)
CHLORIDE SERPL-SCNC: 100 MMOL/L — SIGNIFICANT CHANGE UP (ref 96–108)
CO2 SERPL-SCNC: 24 MMOL/L — SIGNIFICANT CHANGE UP (ref 22–31)
CREAT SERPL-MCNC: 0.5 MG/DL — SIGNIFICANT CHANGE UP (ref 0.5–1.3)
EGFR: 118 ML/MIN/1.73M2 — SIGNIFICANT CHANGE UP
GLUCOSE SERPL-MCNC: 76 MG/DL — SIGNIFICANT CHANGE UP (ref 70–99)
HCT VFR BLD CALC: 31.1 % — LOW (ref 34.5–45)
HGB BLD-MCNC: 9.6 G/DL — LOW (ref 11.5–15.5)
MCHC RBC-ENTMCNC: 23.5 PG — LOW (ref 27–34)
MCHC RBC-ENTMCNC: 30.9 G/DL — LOW (ref 32–36)
MCV RBC AUTO: 76.2 FL — LOW (ref 80–100)
NRBC # BLD: 0 /100 WBCS — SIGNIFICANT CHANGE UP (ref 0–0)
PLATELET # BLD AUTO: 291 K/UL — SIGNIFICANT CHANGE UP (ref 150–400)
POTASSIUM SERPL-MCNC: 3.5 MMOL/L — SIGNIFICANT CHANGE UP (ref 3.5–5.3)
POTASSIUM SERPL-SCNC: 3.5 MMOL/L — SIGNIFICANT CHANGE UP (ref 3.5–5.3)
RBC # BLD: 4.08 M/UL — SIGNIFICANT CHANGE UP (ref 3.8–5.2)
RBC # FLD: 16.8 % — HIGH (ref 10.3–14.5)
SODIUM SERPL-SCNC: 137 MMOL/L — SIGNIFICANT CHANGE UP (ref 135–145)
WBC # BLD: 9.37 K/UL — SIGNIFICANT CHANGE UP (ref 3.8–10.5)
WBC # FLD AUTO: 9.37 K/UL — SIGNIFICANT CHANGE UP (ref 3.8–10.5)

## 2024-12-10 PROCEDURE — 85027 COMPLETE CBC AUTOMATED: CPT

## 2024-12-10 PROCEDURE — 85025 COMPLETE CBC W/AUTO DIFF WBC: CPT

## 2024-12-10 PROCEDURE — 96365 THER/PROPH/DIAG IV INF INIT: CPT

## 2024-12-10 PROCEDURE — 96366 THER/PROPH/DIAG IV INF ADDON: CPT

## 2024-12-10 PROCEDURE — 82947 ASSAY GLUCOSE BLOOD QUANT: CPT

## 2024-12-10 PROCEDURE — 83690 ASSAY OF LIPASE: CPT

## 2024-12-10 PROCEDURE — 84100 ASSAY OF PHOSPHORUS: CPT

## 2024-12-10 PROCEDURE — 36415 COLL VENOUS BLD VENIPUNCTURE: CPT

## 2024-12-10 PROCEDURE — 84295 ASSAY OF SERUM SODIUM: CPT

## 2024-12-10 PROCEDURE — 96367 TX/PROPH/DG ADDL SEQ IV INF: CPT

## 2024-12-10 PROCEDURE — 84702 CHORIONIC GONADOTROPIN TEST: CPT

## 2024-12-10 PROCEDURE — 82803 BLOOD GASES ANY COMBINATION: CPT

## 2024-12-10 PROCEDURE — 83735 ASSAY OF MAGNESIUM: CPT

## 2024-12-10 PROCEDURE — 80048 BASIC METABOLIC PNL TOTAL CA: CPT

## 2024-12-10 PROCEDURE — 85018 HEMOGLOBIN: CPT

## 2024-12-10 PROCEDURE — 85014 HEMATOCRIT: CPT

## 2024-12-10 PROCEDURE — 80053 COMPREHEN METABOLIC PANEL: CPT

## 2024-12-10 PROCEDURE — 84132 ASSAY OF SERUM POTASSIUM: CPT

## 2024-12-10 PROCEDURE — 93005 ELECTROCARDIOGRAM TRACING: CPT

## 2024-12-10 PROCEDURE — 84484 ASSAY OF TROPONIN QUANT: CPT

## 2024-12-10 PROCEDURE — 96376 TX/PRO/DX INJ SAME DRUG ADON: CPT

## 2024-12-10 PROCEDURE — 83605 ASSAY OF LACTIC ACID: CPT

## 2024-12-10 PROCEDURE — 96375 TX/PRO/DX INJ NEW DRUG ADDON: CPT

## 2024-12-10 PROCEDURE — 74177 CT ABD & PELVIS W/CONTRAST: CPT | Mod: MC

## 2024-12-10 PROCEDURE — 82330 ASSAY OF CALCIUM: CPT

## 2024-12-10 PROCEDURE — G0378: CPT

## 2024-12-10 PROCEDURE — 99285 EMERGENCY DEPT VISIT HI MDM: CPT | Mod: 25

## 2024-12-10 PROCEDURE — 82435 ASSAY OF BLOOD CHLORIDE: CPT

## 2024-12-10 RX ORDER — ORAL SEMAGLUTIDE 7 MG/1
1 TABLET ORAL
Refills: 0 | DISCHARGE

## 2024-12-10 RX ORDER — METOCLOPRAMIDE HYDROCHLORIDE 10 MG/1
1 TABLET ORAL
Qty: 21 | Refills: 0
Start: 2024-12-10 | End: 2024-12-16

## 2024-12-10 RX ADMIN — METOCLOPRAMIDE HYDROCHLORIDE 10 MILLIGRAM(S): 10 TABLET ORAL at 05:10

## 2024-12-10 RX ADMIN — METOCLOPRAMIDE HYDROCHLORIDE 10 MILLIGRAM(S): 10 TABLET ORAL at 13:11

## 2024-12-10 RX ADMIN — PANTOPRAZOLE SODIUM 40 MILLIGRAM(S): 40 TABLET, DELAYED RELEASE ORAL at 13:11

## 2024-12-10 RX ADMIN — AMLODIPINE BESYLATE 2.5 MILLIGRAM(S): 10 TABLET ORAL at 05:10

## 2024-12-10 NOTE — DIETITIAN INITIAL EVALUATION ADULT - OTHER INFO
Weight: pt reports ~ 23lbs weight loss x 6 months since starting Ozempic. Current dosing weight is 189.6lbs.

## 2024-12-10 NOTE — PROGRESS NOTE ADULT - REASON FOR ADMISSION
vomiting bad pain, failure to thrive

## 2024-12-10 NOTE — DIETITIAN INITIAL EVALUATION ADULT - REASON FOR ADMISSION
Nausea and vomiting    Chart reviewed, events noted. This is a "44F hx sleeve gastrectomy '18, p/w 3 days of vomiting with associated abdominal pain. Patient now admitted to medicine for continued PO intolerance despite unremarkable imaging. seen by surgery prob 2/2 gastroparesis from using Ozempic 1 mg weekly."

## 2024-12-10 NOTE — DIETITIAN INITIAL EVALUATION ADULT - NSFNSADHERENCEPTAFT_GEN_A_CORE
Pt with T2DM, takes semaglutide, was also taking Ozempic x 6 months however now discontinued due to concern for gastroparesis. Obtain HbA1c as able

## 2024-12-10 NOTE — DIETITIAN INITIAL EVALUATION ADULT - ENERGY INTAKE
In-house pt noted with tolerance to full liquid diet, now advanced to low fiber bariatric phase 2 pureed/soft diet. No nausea, emesis noted. Pt had yet to trial pureed diet at time of RD visit.  Poor (<50%)

## 2024-12-10 NOTE — DISCHARGE NOTE NURSING/CASE MANAGEMENT/SOCIAL WORK - NSDCPEFALRISK_GEN_ALL_CORE
For information on Fall & Injury Prevention, visit: [email or web address] OR  [email or web address] OR  https://www.cdc.gov/steadi/patient.html

## 2024-12-10 NOTE — DIETITIAN INITIAL EVALUATION ADULT - EDUCATION DIETARY MODIFICATIONS
reviewed recommended foods for current diet, discussed small frequent meals. Reducing fiber and fat intake. Discussed appropriate protein shakes as needed./teach back/(2) meets goals/outcomes/verbalization

## 2024-12-10 NOTE — DISCHARGE NOTE PROVIDER - CARE PROVIDER_API CALL
Sharri Mccloud  Family Medicine  [id number] [place] [address]  Phone: [phone]  Fax: [phone]  Established Patient  Follow Up Time: 1-3 days

## 2024-12-10 NOTE — PROGRESS NOTE ADULT - ASSESSMENT
44F hx sleeve gastrectomy '18, admitted to medicine for persistent PO intolerance/failure to thrive. CT unremarkable for intra abdominal pathology related to sleeve. Patient symptoms likely related to Ozempic, which now improved since she has been off in the hospital.    Plan:  - Consider diet since nausea improved: start with full liquid (+ protein shakes) --> advance as tolerated to regular bariatric diet (small meals, 5-6 times/day)  - Continue with PPI  - Recommend follow up with GI/PCP outpatient  - Please call back surgery with any questions or concerns.  - Follow up as outpatient with bariatric surgery    [facility] Surgery  Please page [phone] for all questions.
44F hx sleeve gastrectomy '18, p/w 3 days of vomiting with associated abdominal pain. Patient now admitted to medicine for continued PO intolerance despite unremarkable imaging. seen by surgery prob 2/2 gastroparesis. Nephro on board for Hyponatremia and Hypokalemia    Hyponatremia  Likely prerenal in nature due to vomiting  FUP  Urine  Urine OSM  Can check a Serum OSM, TSH to complete wup  Making urine appears Euvolemic   Resolved  On IVF can maintain gentle hydration while not eating     Hypokalemia  Resolved  Encourage high K diet  Likely related to Vomiting     Anemia  Mild 11.2--> 9.6  Monitor  Can check iron studies    Vomiting   Likely related to gastroparesis  Advance diet as tolerated
nausea  vomiting   abdominal pain        - Likely related to ozempic  - Hold ozempic   - start clears  - ivf  -check electrolytes, replete prn  -ppi once a day  - zofran and reglan PRN   Will follow     I reviewed the overnight course of events on the unit, re-confirming the patient history. I discussed the care with the patient and their family  The plan of care was discussed with the physician assistant and modifications were made to the notation where appropriate.   Differential diagnosis and plan of care discussed with patient after the evaluation  35 minutes spent on total encounter of which more than fifty percent of the encounter was spent counseling and/or coordinating care by the attending physician.  Advanced care planning was discussed with patient and family.  Advanced care planning forms were reviewed and discussed.  Risks, benefits and alternatives of gastroenterologic procedures were discussed in detail and all questions were answered.  
44F hx sleeve gastrectomy '18, pw 3 days of vomiting with associated abdominal pain. Patient now admitted to medicine for continued PO intolerance despite unremarkable imaging.    Plan:  - No acute surgical pathology on CTAP.  - Continue electrolyte repletion and IVF.  - Carafate and PPI.  - Recommend follow up with GI/PCP outpatient.  - Please call back surgery with any questions or concerns.    [facility] Team Surgery  Please page [phone] for all questions.    
44F with history of sleeve gastrectomy presenting with 3 days of vomiting with associated abdominal pain . probably 2/2 gastroparesis 2/2 ozempic use. dose was increased in november.     Plan:  - No acute pathology on CTAP.  -  nausea improved, started clears, will advance to full liquids if tolerates and then to bariatric diet thereafter, will dc home once tolerates a bariatric diet, cont reglan. will need GI F/U  - Electrolytes repleted  - dc ozempic, cont PPI  DVT ppx w sc Lovenox
44F with history of sleeve gastrectomy presenting with 3 days of vomiting with associated abdominal pain . probably 2/2 gastroparesis 2/2 ozempic use. dose was increased in november.     Plan:  - No acute pathology on CTAP.  - NPO 2/2 ongoing nausea vomiting, cont IVF, seen by GI, will get renal consult 2/2 multiple electrolyte abn  - Electrolyte repletion from vomiting.  - dc ozempic, cont PPI, keep NPO, cont IVF  DVT ppx w sc Lovenox
44F with history of sleeve gastrectomy presenting with 3 days of vomiting with associated abdominal pain . probably 2/2 gastroparesis 2/2 ozempic use. dose was increased in november.     Plan:  - No acute pathology on CTAP.  -  nausea improved, ptn is tolerating a LRD. dc home on reglan, pcp F/U, GI F/U. dc ozempic  - Electrolytes repleted  - dc ozempic, cont PPI  DVT ppx w sc Lovenox

## 2024-12-10 NOTE — DISCHARGE NOTE NURSING/CASE MANAGEMENT/SOCIAL WORK - NSPROMEDSBROUGHTTOHOSP_GEN_A_NUR
Arrived to the [facility]. 2 liters NS completed. Patient tolerated well. Any issues or concerns during appointment: none. Patient aware of next infusion appointment on 3-18-19 (date) at [address] [place] [address] (time). Discharged via ambulatory. no

## 2024-12-10 NOTE — DIETITIAN INITIAL EVALUATION ADULT - ORAL INTAKE PTA/DIET HISTORY
Pt reports nausea, emesis and poor PO intake x few days PTA. Consumes ~ 2 meals daily, reports feeling full easily due to history of bariatric surgery. Also states appetite has been decreased since taking Ozempic x ~ 6 months ago. NKFA. Pt denies chewing/swallowing difficulty,  diarrhea, constipation.

## 2024-12-10 NOTE — DIETITIAN INITIAL EVALUATION ADULT - PERTINENT LABORATORY DATA
12-10    137  |  100  |  6[L]  ----------------------------<  76  3.5   |  24  |  0.50    Ca    8.6      10 Dec 2024 06:52

## 2024-12-10 NOTE — PROGRESS NOTE ADULT - PROVIDER SPECIALTY LIST ADULT
Bariatric Surgery
Gastroenterology
Internal Medicine
Nephrology
Surgery
Internal Medicine
Internal Medicine

## 2024-12-10 NOTE — PROGRESS NOTE ADULT - SUBJECTIVE AND OBJECTIVE BOX
Patient is a 44y old  Female who presents with a chief complaint of vomiting bad pain, failure to thrive (08 Dec 2024 08:32)      SUBJECTIVE / OVERNIGHT EVENTS: NPO 2/2 ongoing nausea vomiting, cont IVF, seen by GI, will get renal consult 2/2 multiple electrolyte abn    MEDICATIONS  (STANDING):  amLODIPine   Tablet 2.5 milliGRAM(s) Oral daily  atorvastatin 10 milliGRAM(s) Oral at bedtime  lactated ringers. 1000 milliLiter(s) (125 mL/Hr) IV Continuous <Continuous>  metoclopramide 10 milliGRAM(s) Oral three times a day before meals  pantoprazole  Injectable 40 milliGRAM(s) IV Push daily  potassium chloride  10 mEq/100 mL IVPB 10 milliEquivalent(s) IV Intermittent every 1 hour  sodium chloride 0.9% 1000 milliLiter(s) (250 mL/Hr) IV Continuous <Continuous>    MEDICATIONS  (PRN):  ondansetron Injectable 4 milliGRAM(s) IV Push every 8 hours PRN Nausea and/or Vomiting      Vital Signs Last 24 Hrs  T(F): 99.2 (12-08-24 @ 17:16), Max: 99.3 (12-08-24 @ 09:56)  HR: 96 (12-08-24 @ 17:16) (87 - 100)  BP: 136/85 (12-08-24 @ 17:16) (135/78 - 151/98)  RR: 18 (12-08-24 @ 17:16) (18 - 18)  SpO2: 98% (12-08-24 @ 17:16) (97% - 100%)  Telemetry:   CAPILLARY BLOOD GLUCOSE        I&O's Summary    07 Dec 2024 07:01  -  08 Dec 2024 07:00  --------------------------------------------------------  IN: 1740 mL / OUT: 250 mL / NET: 1490 mL    08 Dec 2024 07:01  -  08 Dec 2024 19:40  --------------------------------------------------------  IN: 0 mL / OUT: 0 mL / NET: 0 mL        PHYSICAL EXAM:  GENERAL: NAD, well-developed  HEAD:  Atraumatic, Normocephalic  EYES: EOMI, PERRLA, conjunctiva and sclera clear  NECK: Supple, No JVD  CHEST/LUNG: Clear to auscultation bilaterally; No wheeze  HEART: Regular rate and rhythm; No murmurs, rubs, or gallops  ABDOMEN: Soft, Nontender, Nondistended; Bowel sounds present  EXTREMITIES:  2+ Peripheral Pulses, No clubbing, cyanosis, or edema  PSYCH: AAOx3  NEUROLOGY: non-focal  SKIN: No rashes or lesions    LABS:    12-08    132[L]  |  93[L]  |  5[L]  ----------------------------<  87  3.0[L]   |  24  |  0.36[L]    Ca    8.7      08 Dec 2024 09:24  Mg     2.2     12-07            Urinalysis Basic - ( 08 Dec 2024 09:24 )    Color: x / Appearance: x / SG: x / pH: x  Gluc: 87 mg/dL / Ketone: x  / Bili: x / Urobili: x   Blood: x / Protein: x / Nitrite: x   Leuk Esterase: x / RBC: x / WBC x   Sq Epi: x / Non Sq Epi: x / Bacteria: x        RADIOLOGY & ADDITIONAL TESTS:    Imaging Personally Reviewed:    Consultant(s) Notes Reviewed:      Care Discussed with Consultants/Other Providers:  
GENERAL SURGERY PROGRESS NOTE    SUBJECTIVE  No acute issues overnight. Seen and examined on morning rounds. Reports she is still unable to tolerate liquids or solids and continues to experience epigastric pain.    10-point review of systems completed and negative except as noted above.      OBJECTIVE    MEDICATIONS  atorvastatin 10 milliGRAM(s) Oral at bedtime  lactated ringers. 1000 milliLiter(s) IV Continuous <Continuous>  sodium chloride 0.9% 1000 milliLiter(s) IV Continuous <Continuous>      PHYSICAL EXAM  T(C): 37.6 (12-07-24 @ 07:28), Max: 37.9 (12-06-24 @ 16:34)  HR: 89 (12-07-24 @ 07:28) (86 - 94)  BP: 173/96 (12-07-24 @ 07:28) (134/85 - 173/96)  RR: 18 (12-07-24 @ 07:28) (18 - 20)  SpO2: 96% (12-07-24 @ 07:28) (96% - 100%)      General: Not acutely distressed  Neuro: AO x4  Respiratory: nonlabored respirations on room air  CV: pulse present   Abdomen: soft, nontender, nondistended, no rebound or guarding, no palpable mass  Extremities: warm    LABS                        11.2   7.51  )-----------( 346      ( 06 Dec 2024 02:29 )             36.0     12-07    x   |  x   |  x   ----------------------------<  x   3.4[L]   |  x   |  x     Ca    8.4      06 Dec 2024 14:14  Phos  3.1     12-06  Mg     2.2     12-07    TPro  8.0  /  Alb  4.3  /  TBili  0.5  /  DBili  x   /  AST  12  /  ALT  10  /  AlkPhos  44  12-06      Urinalysis Basic - ( 06 Dec 2024 14:14 )    Color: x / Appearance: x / SG: x / pH: x  Gluc: 101 mg/dL / Ketone: x  / Bili: x / Urobili: x   Blood: x / Protein: x / Nitrite: x   Leuk Esterase: x / RBC: x / WBC x   Sq Epi: x / Non Sq Epi: x / Bacteria: x        RADIOLOGY & ADDITIONAL STUDIES  
Patient is a 44y old  Female who presents with a chief complaint of vomiting bad pain, failure to thrive (09 Dec 2024 12:51)      SUBJECTIVE / OVERNIGHT EVENTS: nausea improved, started clears, will advance to full liquids if tolerates and then to bariatric diet thereafter, will dc home once tolerates a bariatric diet, cont reglan. will need GI F/U    MEDICATIONS  (STANDING):  amLODIPine   Tablet 2.5 milliGRAM(s) Oral daily  atorvastatin 10 milliGRAM(s) Oral at bedtime  lactated ringers. 1000 milliLiter(s) (125 mL/Hr) IV Continuous <Continuous>  metoclopramide 10 milliGRAM(s) Oral three times a day before meals  pantoprazole  Injectable 40 milliGRAM(s) IV Push daily  sodium chloride 0.9% 1000 milliLiter(s) (250 mL/Hr) IV Continuous <Continuous>    MEDICATIONS  (PRN):  ondansetron Injectable 4 milliGRAM(s) IV Push every 8 hours PRN Nausea and/or Vomiting      Vital Signs Last 24 Hrs  T(F): 98.9 (12-09-24 @ 16:13), Max: 99.6 (12-09-24 @ 05:45)  HR: 95 (12-09-24 @ 16:13) (88 - 96)  BP: 126/90 (12-09-24 @ 16:13) (125/89 - 148/85)  RR: 18 (12-09-24 @ 16:13) (18 - 18)  SpO2: 99% (12-09-24 @ 16:13) (94% - 99%)  Telemetry:   CAPILLARY BLOOD GLUCOSE        I&O's Summary    08 Dec 2024 07:01  -  09 Dec 2024 07:00  --------------------------------------------------------  IN: 1500 mL / OUT: 300 mL / NET: 1200 mL    09 Dec 2024 07:01  -  09 Dec 2024 18:02  --------------------------------------------------------  IN: 1300 mL / OUT: 300 mL / NET: 1000 mL        PHYSICAL EXAM:  GENERAL: NAD, well-developed  HEAD:  Atraumatic, Normocephalic  EYES: EOMI, PERRLA, conjunctiva and sclera clear  NECK: Supple, No JVD  CHEST/LUNG: Clear to auscultation bilaterally; No wheeze  HEART: Regular rate and rhythm; No murmurs, rubs, or gallops  ABDOMEN: Soft, Nontender, Nondistended; Bowel sounds present  EXTREMITIES:  2+ Peripheral Pulses, No clubbing, cyanosis, or edema  PSYCH: AAOx3  NEUROLOGY: non-focal  SKIN: No rashes or lesions    LABS:    12-09    134[L]  |  95[L]  |  6[L]  ----------------------------<  73  3.5   |  24  |  0.47[L]    Ca    8.8      09 Dec 2024 07:38            Urinalysis Basic - ( 09 Dec 2024 07:38 )    Color: x / Appearance: x / SG: x / pH: x  Gluc: 73 mg/dL / Ketone: x  / Bili: x / Urobili: x   Blood: x / Protein: x / Nitrite: x   Leuk Esterase: x / RBC: x / WBC x   Sq Epi: x / Non Sq Epi: x / Bacteria: x        RADIOLOGY & ADDITIONAL TESTS:    Imaging Personally Reviewed:    Consultant(s) Notes Reviewed:      Care Discussed with Consultants/Other Providers:  
Patient is a 44y old  Female who presents with a chief complaint of vomiting bad pain, failure to thrive (10 Dec 2024 08:59)      SUBJECTIVE / OVERNIGHT EVENTS: ptn is tolerating a LRD. dc home on reglan, pcp F/U, GI F/U. dc ozempic    MEDICATIONS  (STANDING):  amLODIPine   Tablet 2.5 milliGRAM(s) Oral daily  atorvastatin 10 milliGRAM(s) Oral at bedtime  lactated ringers. 1000 milliLiter(s) (125 mL/Hr) IV Continuous <Continuous>  metoclopramide 10 milliGRAM(s) Oral three times a day before meals  pantoprazole  Injectable 40 milliGRAM(s) IV Push daily  sodium chloride 0.9% 1000 milliLiter(s) (250 mL/Hr) IV Continuous <Continuous>    MEDICATIONS  (PRN):  ondansetron Injectable 4 milliGRAM(s) IV Push every 8 hours PRN Nausea and/or Vomiting      Vital Signs Last 24 Hrs  T(F): 98 (12-10-24 @ 09:15), Max: 99.5 (12-09-24 @ 13:45)  HR: 89 (12-10-24 @ 09:15) (78 - 96)  BP: 104/69 (12-10-24 @ 09:15) (104/69 - 129/85)  RR: 18 (12-10-24 @ 09:15) (18 - 18)  SpO2: 97% (12-10-24 @ 09:15) (96% - 99%)  Telemetry:   CAPILLARY BLOOD GLUCOSE        I&O's Summary    09 Dec 2024 07:01  -  10 Dec 2024 07:00  --------------------------------------------------------  IN: 3300 mL / OUT: 300 mL / NET: 3000 mL        PHYSICAL EXAM:  GENERAL: NAD, well-developed  HEAD:  Atraumatic, Normocephalic  EYES: EOMI, PERRLA, conjunctiva and sclera clear  NECK: Supple, No JVD  CHEST/LUNG: Clear to auscultation bilaterally; No wheeze  HEART: Regular rate and rhythm; No murmurs, rubs, or gallops  ABDOMEN: Soft, Nontender, Nondistended; Bowel sounds present  EXTREMITIES:  2+ Peripheral Pulses, No clubbing, cyanosis, or edema  PSYCH: AAOx3  NEUROLOGY: non-focal  SKIN: No rashes or lesions    LABS:                        9.6    9.37  )-----------( 291      ( 10 Dec 2024 06:52 )             31.1     12-10    137  |  100  |  6[L]  ----------------------------<  76  3.5   |  24  |  0.50    Ca    8.6      10 Dec 2024 06:52            Urinalysis Basic - ( 10 Dec 2024 06:52 )    Color: x / Appearance: x / SG: x / pH: x  Gluc: 76 mg/dL / Ketone: x  / Bili: x / Urobili: x   Blood: x / Protein: x / Nitrite: x   Leuk Esterase: x / RBC: x / WBC x   Sq Epi: x / Non Sq Epi: x / Bacteria: x        RADIOLOGY & ADDITIONAL TESTS:    Imaging Personally Reviewed:    Consultant(s) Notes Reviewed:      Care Discussed with Consultants/Other Providers:  
[facility]      Sherif Webb NP    [address] [place] [address]  [phone]      INTERVAL HPI/OVERNIGHT EVENTS:    no new events  feels better  wants to try something by mouth     MEDICATIONS  (STANDING):  amLODIPine   Tablet 2.5 milliGRAM(s) Oral daily  atorvastatin 10 milliGRAM(s) Oral at bedtime  lactated ringers. 1000 milliLiter(s) (125 mL/Hr) IV Continuous <Continuous>  metoclopramide 10 milliGRAM(s) Oral three times a day before meals  pantoprazole  Injectable 40 milliGRAM(s) IV Push daily  sodium chloride 0.9% 1000 milliLiter(s) (250 mL/Hr) IV Continuous <Continuous>    MEDICATIONS  (PRN):  ondansetron Injectable 4 milliGRAM(s) IV Push every 8 hours PRN Nausea and/or Vomiting      Allergies    No Known Allergies    Intolerances        ROS:   General:  No  fevers, chills, night sweats, fatigue,   Eyes:  Good vision, no reported pain  ENT:  No sore throat, pain, runny nose, dysphagia  CV:  No pain, palpitations, hypo/hypertension  Resp:  No dyspnea, cough, tachypnea, wheezing  GI:  No pain, No nausea, No vomiting, No diarrhea, No constipation, No weight loss, No fever, No pruritis, No rectal bleeding, No tarry stools, No dysphagia,  :  No pain, bleeding, incontinence, nocturia  Muscle:  No pain, weakness  Neuro:  No weakness, tingling, memory problems  Psych:  No fatigue, insomnia, mood problems, depression  Endocrine:  No polyuria, polydipsia, cold/heat intolerance  Heme:  No petechiae, ecchymosis, easy bruisability  Skin:  No rash, tattoos, scars, edema      PHYSICAL EXAM:   Vital Signs:  Vital Signs Last 24 Hrs  T(C): 37 (09 Dec 2024 09:32), Max: 37.6 (09 Dec 2024 05:45)  T(F): 98.6 (09 Dec 2024 09:32), Max: 99.6 (09 Dec 2024 05:45)  HR: 94 (09 Dec 2024 09:32) (88 - 100)  BP: 148/85 (09 Dec 2024 05:45) (129/85 - 148/85)  BP(mean): --  RR: 18 (09 Dec 2024 09:32) (18 - 18)  SpO2: 99% (09 Dec 2024 09:32) (94% - 100%)    Parameters below as of 09 Dec 2024 09:32  Patient On (Oxygen Delivery Method): room air      Daily     Daily     GENERAL:  Appears stated age,   HEENT:  NC/AT,    CHEST:  Full & symmetric excursion,   HEART:  Regular rhythm,  ABDOMEN:  Soft, non-tender, non-distended,  EXTEREMITIES:  no cyanosis  SKIN:  No rash  NEURO:  Alert,       LABS:    12-09    134[L]  |  95[L]  |  6[L]  ----------------------------<  73  3.5   |  24  |  0.47[L]    Ca    8.8      09 Dec 2024 07:38        Urinalysis Basic - ( 09 Dec 2024 07:38 )    Color: x / Appearance: x / SG: x / pH: x  Gluc: 73 mg/dL / Ketone: x  / Bili: x / Urobili: x   Blood: x / Protein: x / Nitrite: x   Leuk Esterase: x / RBC: x / WBC x   Sq Epi: x / Non Sq Epi: x / Bacteria: x        RADIOLOGY & ADDITIONAL TESTS:  
[facility]    Dr. Gonzalo Freed     Office [phone] (9 am to 5 pm)  Service: [phone] (5pm to 9am)  Also Available on TEAMS      RENAL PROGRESS NOTE: DATE OF SERVICE 12-10-24 @ 08:59    Patient is a 44y old  Female who presents with a chief complaint of vomiting bad pain, failure to thrive (09 Dec 2024 18:01)      Patient seen and examined at bedside. No chest pain/sob    VITALS:  T(F): 98.1 (12-10-24 @ 04:39), Max: 99.5 (12-09-24 @ 13:45)  HR: 87 (12-10-24 @ 04:39)  BP: 106/70 (12-10-24 @ 04:39)  RR: 18 (12-10-24 @ 04:39)  SpO2: 98% (12-10-24 @ 04:39)  Wt(kg): --    12-09 @ 07:01  -  12-10 @ 07:00  --------------------------------------------------------  IN: 3300 mL / OUT: 300 mL / NET: 3000 mL          PHYSICAL EXAM:  Constitutional: NAD  Neck: No JVD  Respiratory: CTAB, no wheezes, rales or rhonchi  Cardiovascular: S1, S2, RRR  Gastrointestinal: BS+, soft, NT/ND  Extremities: No peripheral edema    Hospital Medications:   MEDICATIONS  (STANDING):  amLODIPine   Tablet 2.5 milliGRAM(s) Oral daily  atorvastatin 10 milliGRAM(s) Oral at bedtime  lactated ringers. 1000 milliLiter(s) (125 mL/Hr) IV Continuous <Continuous>  metoclopramide 10 milliGRAM(s) Oral three times a day before meals  pantoprazole  Injectable 40 milliGRAM(s) IV Push daily  sodium chloride 0.9% 1000 milliLiter(s) (250 mL/Hr) IV Continuous <Continuous>      LABS:  12-10    137  |  100  |  6[L]  ----------------------------<  76  3.5   |  24  |  0.50    Ca    8.6      10 Dec 2024 06:52      Creatinine Trend: 0.50 <--, 0.47 <--, 0.38 <--, 0.36 <--, 0.34 <--, 0.40 <--, 0.49 <--                                9.6    9.37  )-----------( 291      ( 10 Dec 2024 06:52 )             31.1     Urine Studies:  Urinalysis - [12-10-24 @ 06:52]      Color  / Appearance  / SG  / pH       Gluc 76 / Ketone   / Bili  / Urobili        Blood  / Protein  / Leuk Est  / Nitrite       RBC  / WBC  / Hyaline  / Gran  / Sq Epi  / Non Sq Epi  / Bacteria             RADIOLOGY & ADDITIONAL STUDIES:  
Bariatric Surgery Follow-up Note:    S: no acute events overnight. Reports improvement of nausea or vomiting. Has been taking sips of water. Also refers diarrhea. No fevers or abdominal pain.    O:   Vital Signs Last 24 Hrs  T(C): 37.6 (09 Dec 2024 05:45), Max: 37.6 (09 Dec 2024 05:45)  T(F): 99.6 (09 Dec 2024 05:45), Max: 99.6 (09 Dec 2024 05:45)  HR: 91 (09 Dec 2024 05:45) (88 - 100)  BP: 148/85 (09 Dec 2024 05:45) (129/85 - 151/98)  BP(mean): --  RR: 18 (09 Dec 2024 05:45) (18 - 18)  SpO2: 94% (09 Dec 2024 05:45) (94% - 100%)  Parameters below as of 09 Dec 2024 05:45  Patient On (Oxygen Delivery Method): room air    No acute distress, alert, oriented x3  Breathing comfortably on room air  RRR  Abdomen soft, non distended, non tender. No guarding or rebound    I&O's Summary    08 Dec 2024 07:01  -  09 Dec 2024 07:00  --------------------------------------------------------  IN: 1500 mL / OUT: 300 mL / NET: 1200 mL

## 2024-12-10 NOTE — DISCHARGE NOTE PROVIDER - NSDCMRMEDTOKEN_GEN_ALL_CORE_FT
hydroCHLOROthiazide 25 mg oral tablet: 1 tab(s) orally once a day (in the morning)  metoclopramide 10 mg oral tablet: 1 tab(s) orally 3 times a day (before meals)  NexIUM 24HR 20 mg oral delayed release capsule: 1 cap(s) orally once a day (in the morning)  simvastatin 20 mg oral tablet: 1 tab(s) orally once a day (in the evening)  sucralfate 1 g/10 mL oral suspension: 10 milliliter(s) orally 2 times a day as needed

## 2024-12-10 NOTE — DISCHARGE NOTE PROVIDER - HOSPITAL COURSE
HPI:  44F hx sleeve gastrectomy '18, p/w 3 days of vomiting with associated abdominal pain. Patient now admitted to medicine for continued PO intolerance despite unremarkable imaging. seen by surgery prob 2/2 gastroparesis from using Ozempic 1 mg weekly. will keep on clears and IVF (07 Dec 2024 13:12)    Hospital Course:  - No acute pathology on CTAP.  - NPO until nausea and vomiting resolved. GI following.  - Renal and bariatric surgery consulted. Rec trial advancing diet once nausea and vomiting stops.  - nausea improved, ptn is tolerating a LRD. dc home on reglan, pcp F/U, GI F/U. dc ozempic  - Electrolytes repleted  - dc ozempic, cont PPI    Discharge planning discussed with attending Dr Roth. Patient is medically cleared and stable for discharge home. Medication Reconciliation reviewed with attending. Follow up outpatient with your PCP.    Important Medication Changes and Reason:  - HOLD ozempic  - Reglan  - PPI for GI PPX    Active or Pending Issues Requiring Follow-up:  - PCP    Advanced Directives:   [X] Full code  [ ] DNR  [ ] Hospice    Discharge Diagnoses:  Abdominal Pain

## 2024-12-10 NOTE — DISCHARGE NOTE PROVIDER - NSDCCPCAREPLAN_GEN_ALL_CORE_FT
PRINCIPAL DISCHARGE DIAGNOSIS  Diagnosis: Nausea and vomiting  Assessment and Plan of Treatment: CT abdomen and pelvis negative for acute pathology.   Continue with reglan before meals for nausea. You are now tolerating a regular low residue diet. Continue daily proton pump inhibitor.  Symptoms lileky caused by gastroparesis from ozempic use. Please HOLD ozempic and follow-up with your primary care provider within 1 week of discharge.      SECONDARY DISCHARGE DIAGNOSES  Diagnosis: Hypokalemia  Assessment and Plan of Treatment: Low potassium likely due to vomiting. Levels repleted and normalized.

## 2024-12-10 NOTE — DISCHARGE NOTE NURSING/CASE MANAGEMENT/SOCIAL WORK - PATIENT PORTAL LINK FT
You can access the FollowMyHealth Patient Portal offered by [facility] by registering at the following website: [email or web address]. By joining [employer]’s FollowMyHealth portal, you will also be able to view your health information using other applications (apps) compatible with our system.

## 2024-12-10 NOTE — DIETITIAN INITIAL EVALUATION ADULT - PERTINENT MEDS FT
MEDICATIONS  (STANDING):  amLODIPine   Tablet 2.5 milliGRAM(s) Oral daily  atorvastatin 10 milliGRAM(s) Oral at bedtime  lactated ringers. 1000 milliLiter(s) (125 mL/Hr) IV Continuous <Continuous>  metoclopramide 10 milliGRAM(s) Oral three times a day before meals  pantoprazole  Injectable 40 milliGRAM(s) IV Push daily  sodium chloride 0.9% 1000 milliLiter(s) (250 mL/Hr) IV Continuous <Continuous>    MEDICATIONS  (PRN):  ondansetron Injectable 4 milliGRAM(s) IV Push every 8 hours PRN Nausea and/or Vomiting

## 2024-12-10 NOTE — DISCHARGE NOTE NURSING/CASE MANAGEMENT/SOCIAL WORK - FINANCIAL ASSISTANCE
[facility] provides services at a reduced cost to those who are determined to be eligible through [facility]’s financial assistance program. Information regarding [facility]’s financial assistance program can be found by going to [email or web address] or by calling [phone].

## 2024-12-10 NOTE — DISCHARGE NOTE PROVIDER - NSDCFUADDAPPT_GEN_ALL_CORE_FT
APPTS ARE READY TO BE MADE: [X] YES    Best Family or Patient Contact (if needed):    Additional Information about above appointments (if needed):    1:   2:   3:     Other comments or requests:    APPTS ARE READY TO BE MADE: [X] YES    Best Family or Patient Contact (if needed):    Additional Information about above appointments (if needed):    1:   2:   3:     Other comments or requests:     Patient informed us they already have secured a follow up appointment which is not visible on [facility] and declined to provide appointment details.

## 2025-06-03 ENCOUNTER — EMERGENCY (EMERGENCY)
Facility: HOSPITAL | Age: 45
LOS: 1 days | End: 2025-06-03
Attending: EMERGENCY MEDICINE
Payer: COMMERCIAL

## 2025-06-03 VITALS
TEMPERATURE: 98 F | WEIGHT: 197.98 LBS | RESPIRATION RATE: 20 BRPM | HEART RATE: 90 BPM | HEIGHT: 62 IN | DIASTOLIC BLOOD PRESSURE: 94 MMHG | SYSTOLIC BLOOD PRESSURE: 138 MMHG | OXYGEN SATURATION: 97 %

## 2025-06-03 VITALS
OXYGEN SATURATION: 100 % | TEMPERATURE: 98 F | HEART RATE: 89 BPM | DIASTOLIC BLOOD PRESSURE: 88 MMHG | SYSTOLIC BLOOD PRESSURE: 123 MMHG | RESPIRATION RATE: 16 BRPM

## 2025-06-03 DIAGNOSIS — Z98.891 HISTORY OF UTERINE SCAR FROM PREVIOUS SURGERY: Chronic | ICD-10-CM

## 2025-06-03 DIAGNOSIS — Z98.84 BARIATRIC SURGERY STATUS: Chronic | ICD-10-CM

## 2025-06-03 LAB
ACANTHOCYTES BLD QL SMEAR: SLIGHT — SIGNIFICANT CHANGE UP
ALBUMIN SERPL ELPH-MCNC: 4.1 G/DL — SIGNIFICANT CHANGE UP (ref 3.3–5)
ALP SERPL-CCNC: 56 U/L — SIGNIFICANT CHANGE UP (ref 40–120)
ALT FLD-CCNC: 13 U/L — SIGNIFICANT CHANGE UP (ref 10–45)
ANION GAP SERPL CALC-SCNC: 14 MMOL/L — SIGNIFICANT CHANGE UP (ref 5–17)
ANISOCYTOSIS BLD QL: SLIGHT — SIGNIFICANT CHANGE UP
AST SERPL-CCNC: 19 U/L — SIGNIFICANT CHANGE UP (ref 10–40)
BASOPHILS # BLD AUTO: 0 K/UL — SIGNIFICANT CHANGE UP (ref 0–0.2)
BASOPHILS NFR BLD AUTO: 0 % — SIGNIFICANT CHANGE UP (ref 0–2)
BILIRUB SERPL-MCNC: 0.2 MG/DL — SIGNIFICANT CHANGE UP (ref 0.2–1.2)
BUN SERPL-MCNC: 9 MG/DL — SIGNIFICANT CHANGE UP (ref 7–23)
CALCIUM SERPL-MCNC: 9.2 MG/DL — SIGNIFICANT CHANGE UP (ref 8.4–10.5)
CHLORIDE SERPL-SCNC: 99 MMOL/L — SIGNIFICANT CHANGE UP (ref 96–108)
CO2 SERPL-SCNC: 23 MMOL/L — SIGNIFICANT CHANGE UP (ref 22–31)
CREAT SERPL-MCNC: 0.47 MG/DL — LOW (ref 0.5–1.3)
DACRYOCYTES BLD QL SMEAR: SLIGHT — SIGNIFICANT CHANGE UP
EGFR: 120 ML/MIN/1.73M2 — SIGNIFICANT CHANGE UP
EGFR: 120 ML/MIN/1.73M2 — SIGNIFICANT CHANGE UP
EOSINOPHIL # BLD AUTO: 0.37 K/UL — SIGNIFICANT CHANGE UP (ref 0–0.5)
EOSINOPHIL NFR BLD AUTO: 4.4 % — SIGNIFICANT CHANGE UP (ref 0–6)
GLUCOSE SERPL-MCNC: 91 MG/DL — SIGNIFICANT CHANGE UP (ref 70–99)
HCG SERPL-ACNC: <2 MIU/ML — SIGNIFICANT CHANGE UP
HCT VFR BLD CALC: 31.5 % — LOW (ref 34.5–45)
HGB BLD-MCNC: 9.3 G/DL — LOW (ref 11.5–15.5)
HYPOCHROMIA BLD QL: SLIGHT — SIGNIFICANT CHANGE UP
LYMPHOCYTES # BLD AUTO: 2.64 K/UL — SIGNIFICANT CHANGE UP (ref 1–3.3)
LYMPHOCYTES # BLD AUTO: 31.6 % — SIGNIFICANT CHANGE UP (ref 13–44)
MACROCYTES BLD QL: SLIGHT — SIGNIFICANT CHANGE UP
MANUAL SMEAR VERIFICATION: SIGNIFICANT CHANGE UP
MCHC RBC-ENTMCNC: 21 PG — LOW (ref 27–34)
MCHC RBC-ENTMCNC: 29.5 G/DL — LOW (ref 32–36)
MCV RBC AUTO: 71.3 FL — LOW (ref 80–100)
MICROCYTES BLD QL: SLIGHT — SIGNIFICANT CHANGE UP
MONOCYTES # BLD AUTO: 0.14 K/UL — SIGNIFICANT CHANGE UP (ref 0–0.9)
MONOCYTES NFR BLD AUTO: 1.7 % — LOW (ref 2–14)
NEUTROPHILS # BLD AUTO: 5.12 K/UL — SIGNIFICANT CHANGE UP (ref 1.8–7.4)
NEUTROPHILS NFR BLD AUTO: 58.8 % — SIGNIFICANT CHANGE UP (ref 43–77)
NEUTS BAND # BLD: 2.6 % — SIGNIFICANT CHANGE UP (ref 0–8)
NEUTS BAND NFR BLD: 2.6 % — SIGNIFICANT CHANGE UP (ref 0–8)
OVALOCYTES BLD QL SMEAR: SLIGHT — SIGNIFICANT CHANGE UP
PLAT MORPH BLD: ABNORMAL
PLATELET # BLD AUTO: 404 K/UL — HIGH (ref 150–400)
POIKILOCYTOSIS BLD QL AUTO: SLIGHT — SIGNIFICANT CHANGE UP
POLYCHROMASIA BLD QL SMEAR: SLIGHT — SIGNIFICANT CHANGE UP
POTASSIUM SERPL-MCNC: 3.6 MMOL/L — SIGNIFICANT CHANGE UP (ref 3.5–5.3)
POTASSIUM SERPL-SCNC: 3.6 MMOL/L — SIGNIFICANT CHANGE UP (ref 3.5–5.3)
PROT SERPL-MCNC: 8.2 G/DL — SIGNIFICANT CHANGE UP (ref 6–8.3)
RBC # BLD: 4.42 M/UL — SIGNIFICANT CHANGE UP (ref 3.8–5.2)
RBC # FLD: 18.1 % — HIGH (ref 10.3–14.5)
RBC BLD AUTO: ABNORMAL
SODIUM SERPL-SCNC: 136 MMOL/L — SIGNIFICANT CHANGE UP (ref 135–145)
STOMATOCYTES BLD QL SMEAR: SLIGHT — SIGNIFICANT CHANGE UP
VARIANT LYMPHS # BLD: 0.9 % — SIGNIFICANT CHANGE UP (ref 0–6)
VARIANT LYMPHS NFR BLD MANUAL: 0.9 % — SIGNIFICANT CHANGE UP (ref 0–6)
WBC # BLD: 8.34 K/UL — SIGNIFICANT CHANGE UP (ref 3.8–10.5)
WBC # FLD AUTO: 8.34 K/UL — SIGNIFICANT CHANGE UP (ref 3.8–10.5)

## 2025-06-03 PROCEDURE — 71045 X-RAY EXAM CHEST 1 VIEW: CPT

## 2025-06-03 PROCEDURE — 70498 CT ANGIOGRAPHY NECK: CPT | Mod: 26

## 2025-06-03 PROCEDURE — 99285 EMERGENCY DEPT VISIT HI MDM: CPT

## 2025-06-03 PROCEDURE — 84702 CHORIONIC GONADOTROPIN TEST: CPT

## 2025-06-03 PROCEDURE — 70498 CT ANGIOGRAPHY NECK: CPT

## 2025-06-03 PROCEDURE — 70450 CT HEAD/BRAIN W/O DYE: CPT | Mod: 26,59

## 2025-06-03 PROCEDURE — 70496 CT ANGIOGRAPHY HEAD: CPT | Mod: 26

## 2025-06-03 PROCEDURE — 70450 CT HEAD/BRAIN W/O DYE: CPT

## 2025-06-03 PROCEDURE — 36000 PLACE NEEDLE IN VEIN: CPT | Mod: XU

## 2025-06-03 PROCEDURE — 93010 ELECTROCARDIOGRAM REPORT: CPT

## 2025-06-03 PROCEDURE — 93005 ELECTROCARDIOGRAM TRACING: CPT

## 2025-06-03 PROCEDURE — 99285 EMERGENCY DEPT VISIT HI MDM: CPT | Mod: 25

## 2025-06-03 PROCEDURE — 85025 COMPLETE CBC W/AUTO DIFF WBC: CPT

## 2025-06-03 PROCEDURE — 80053 COMPREHEN METABOLIC PANEL: CPT

## 2025-06-03 PROCEDURE — 71045 X-RAY EXAM CHEST 1 VIEW: CPT | Mod: 26

## 2025-06-03 PROCEDURE — 70496 CT ANGIOGRAPHY HEAD: CPT

## 2025-06-03 RX ORDER — MECLIZINE HCL 12.5 MG
25 TABLET ORAL ONCE
Refills: 0 | Status: COMPLETED | OUTPATIENT
Start: 2025-06-03 | End: 2025-06-03

## 2025-06-03 RX ORDER — MECLIZINE HCL 12.5 MG
1 TABLET ORAL
Qty: 42 | Refills: 0
Start: 2025-06-03 | End: 2025-06-16

## 2025-06-03 RX ADMIN — Medication 1000 MILLILITER(S): at 16:37

## 2025-06-03 RX ADMIN — Medication 25 MILLIGRAM(S): at 16:36

## 2025-06-03 NOTE — ED PROVIDER NOTE - NSFOLLOWUPINSTRUCTIONS_ED_ALL_ED_FT
You were seen for dizziness.     Your hemoglobin was 9.3, stable from prior. Your labs were otherwise reassuring.     CT head showed no acute findings.     Your symptoms were improved with medication    take meclizine 25mg three times a day as needed    follow up with primary doctor in 2-3 days.    Return to emergency room for: chest pain, shortness of breath, you pass out, vision changes, severe headache, weakness, numbness, or tingling, confusion, fast heart rate, facial droop, difficulty speaking.

## 2025-06-03 NOTE — ED PROVIDER NOTE - PROGRESS NOTE DETAILS
Kapil Augustin MD PGY-1: reassessed, patient feeling much better, dizziness improved with meclizine. Pending CT studies. Labs overall reassuring however hemoglobin 9.3, currently on menstrual cycle and does have a history of anemia. Likely okay to dc home pending remainder of workup.

## 2025-06-03 NOTE — ED PROVIDER NOTE - CLINICAL SUMMARY MEDICAL DECISION MAKING FREE TEXT BOX
44-year-old female PMH HTN, HLD, prior history of diabetes not on medication, presenting for evaluation of dizziness that began this morning as well as hoarseness of voice that began Sunday.  Patient states she has had vertigo in the past and this feels similar.  Its triggered every time she moves her head and resolves with rest.  Feels like the room spinning around her.  She denies any fevers, chills, chest pain, shortness of breath, abdominal pain, urinary symptoms.  No anticoagulation, no blood in the stools or dark tarry stools.  No known drug allergies.  LMP today.  Next    Triage vitals notable for /99, heart rate 87, T98.2, O2 99% on room air    On exam well-appearing, 4, yes no acute distress, nontoxic.  HEENT exam with supple neck, PERRL, EOMI grossly intact.  Uvula midline, no posterior oral pharyngeal edema, erythema, or exudates.  Normal work of breathing on room air with clear lungs.  Regular rate for social work history she IV treatment while she is on the cardiac monitor myocardial infarction await social work and discharge so she is can talk to social work and she is just getting get like a basic cardiac sound like she want to talk to social work to talk about [place] right yeah we have sent the portal again he and rhythm.  Neurological exam nonfocal with grossly intact cranial nerves II through XII.  No deformities or edema.  Dizziness triggered on examination with movement of head.  Next    A/P,  Suspect most likely positional vertigo symptoms triggered by movement resolved with rest after several moments.  Neurological exam nonfocal with no other red flag signs such as dysarthria, hearing changes, difficulty swallowing.  Physical coarse voice but no sign of infection, uvula is midline.  Will plan on CBC, CMP as well as CT head and angio head and neck and reevaluate.  Will give meclizine and reassess. 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  Patient states she has had vertigo in the past and this feels similar.  Its triggered every time she moves her head and resolves with rest.  Feels like the room spinning around her.  She denies any fevers, chills, chest pain, shortness of breath, abdominal pain, urinary symptoms.  No anticoagulation, no blood in the stools or dark tarry stools.  No known drug allergies.  LMP today.  Next    Triage vitals notable for /99, heart rate 87, T98.2, O2 99% on room air    On exam well-appearing, 4, yes no acute distress, nontoxic.  HEENT exam with supple neck, PERRL, EOMI grossly intact.  Uvula midline, no posterior oral pharyngeal edema, erythema, or exudates.  Normal work of breathing on room air with clear lungs.  Regular rate for social work history she IV treatment while she is on the cardiac monitor myocardial infarction await social work and discharge so she is can talk to social work and she is just getting get like a basic cardiac sound like she want to talk to social work to talk about [place] right yeah we have sent the portal again he and rhythm.  Neurological exam nonfocal with grossly intact cranial nerves II through XII.  No deformities or edema.  Dizziness triggered on examination with movement of head,   ecg normal   A/P,  Suspect most likely positional vertigo symptoms triggered by movement resolved with rest after several moments and viral syndrome   .  Neurological exam nonfocal with no other red flag signs such as dysarthria, hearing changes, difficulty swallowing.  Physical coarse voice but no sign of infection, uvula is midline.  Will plan on CBC, CMP as well as CT head and angio head and neck and reevaluate.  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  Patient states she has had vertigo in the past and this feels similar.  Its triggered every time she moves her head and resolves with rest.  Feels like the room spinning around her.  She denies any fevers, chills, chest pain, shortness of breath, abdominal pain, urinary symptoms.  No anticoagulation, no blood in the stools or dark tarry stools.  No known drug allergies.  LMP today.  Next    Triage vitals notable for /99, heart rate 87, T98.2, O2 99% on room air    On exam well-appearing,   no acute distress, nontoxic.  HEENT exam with supple neck, PERRL, EOMI grossly intact.  Uvula midline, no posterior oral pharyngeal edema, erythema, or exudates.  Normal work of breathing on room air with clear lungs.  Regular rate and rhythm.  Neurological exam nonfocal with grossly intact cranial nerves II through XII.  No deformities or edema.  Dizziness triggered on examination with movement of head,   ecg normal     A/P,  Suspect most likely positional vertigo symptoms triggered by movement resolved with rest after several moments and viral syndrome   .  Neurological exam nonfocal with no other red flag signs such as dysarthria, hearing changes, difficulty swallowing.  Physical coarse voice but no sign of infection, uvula is midline.  Will plan on CBC, CMP as well as CT head and angio head and neck and reevaluate.  Will give meclizine and reassess.ZR

## 2025-06-03 NOTE — ED PROVIDER NOTE - PATIENT PORTAL LINK FT
You can access the FollowMyHealth Patient Portal offered by [facility] by registering at the following website: [email or web address]. By joining [employer]’s FollowMyHealth portal, you will also be able to view your health information using other applications (apps) compatible with our system.

## 2025-06-03 NOTE — ED ADULT NURSE REASSESSMENT NOTE - NS ED NURSE REASSESS COMMENT FT1
Received handoff from [facility]. Introduced self to pt, VSS, no signs of acute distress, denies any CP, headache, dizziness, vision changes. No other complaints at this time,  at bedside, safety & comfort maintained.

## 2025-06-03 NOTE — ED ADULT NURSE NOTE - OBJECTIVE STATEMENT
44y female, AAOx4, PMH ***, 44y female, AAOx4, PMH ***, reports vertigo like symptoms starting today, one episode prior and was seen in ED and DC post meds, Neuro exam otherwise unremarkable, denies headache, chest pain, shortness of breath, abdominal pain, n/v/d, placed in gown, side rails up for safety, bed in lowest position, call bell within reach, patient and family educated on plan of care, comfort and safety provided.

## 2025-07-18 NOTE — PATIENT PROFILE ADULT - FUNCTIONAL ASSESSMENT - DAILY ACTIVITY 5.
Patient is here follow-up medical weight loss which is doing well patient is less than 5%.  Patient did great on the prednisone for the hip discomfort and inflammation but now seems to be worsening again.  He did not get the x-rays.    REVIEW OF SYSTEMS: 12 systems negative except for those mentioned in the HPI.    PHYSICAL EXAMINATION:   Constitutional: The patient is alert, in no acute  distress.  Eyes: Extraocular movements are intact.   ENT: external ear canals patent  Neck: no  JVD.  Heart: no JVD  Lungs: Normal respiration without stridor or nasal flaring   Psychiatric: Good judgment and insight. Normal affect and mood.  Skin: no rashes or lesions    Assessment:  per EMR    Plan:  OARRS reviewed appropriate.  Will follow-up in 1 month medical weight loss is done well.  Would go on Toradol.  Left to do formal physical therapy for the hip and groin discomfort.  Will go ahead and get the x-rays patient is not yet done follow-up in a month    This dictation was created using Dragon dictation and may contain errors   4 = No assist / stand by assistance